# Patient Record
Sex: FEMALE | Race: WHITE | NOT HISPANIC OR LATINO | Employment: OTHER | URBAN - METROPOLITAN AREA
[De-identification: names, ages, dates, MRNs, and addresses within clinical notes are randomized per-mention and may not be internally consistent; named-entity substitution may affect disease eponyms.]

---

## 2017-05-04 ENCOUNTER — APPOINTMENT (OUTPATIENT)
Dept: WOMENS IMAGING | Facility: HOSPITAL | Age: 62
End: 2017-05-04

## 2017-05-04 PROCEDURE — 77067 SCR MAMMO BI INCL CAD: CPT | Performed by: RADIOLOGY

## 2019-09-18 ENCOUNTER — APPOINTMENT (OUTPATIENT)
Dept: WOMENS IMAGING | Facility: HOSPITAL | Age: 64
End: 2019-09-18

## 2019-09-18 PROCEDURE — 77067 SCR MAMMO BI INCL CAD: CPT | Performed by: RADIOLOGY

## 2021-05-19 ENCOUNTER — APPOINTMENT (OUTPATIENT)
Dept: WOMENS IMAGING | Facility: HOSPITAL | Age: 66
End: 2021-05-19

## 2021-05-19 PROCEDURE — 77067 SCR MAMMO BI INCL CAD: CPT | Performed by: RADIOLOGY

## 2022-03-12 DIAGNOSIS — E11.641 TYPE 2 DIABETES MELLITUS WITH HYPOGLYCEMIA AND COMA, WITH LONG-TERM CURRENT USE OF INSULIN: ICD-10-CM

## 2022-03-12 DIAGNOSIS — Z79.4 TYPE 2 DIABETES MELLITUS WITH HYPOGLYCEMIA AND COMA, WITH LONG-TERM CURRENT USE OF INSULIN: ICD-10-CM

## 2022-03-12 DIAGNOSIS — I10 PRIMARY HYPERTENSION: ICD-10-CM

## 2022-03-12 DIAGNOSIS — E03.9 HYPOTHYROIDISM, UNSPECIFIED TYPE: Primary | ICD-10-CM

## 2022-05-06 ENCOUNTER — LAB (OUTPATIENT)
Dept: FAMILY MEDICINE CLINIC | Facility: CLINIC | Age: 67
End: 2022-05-06

## 2022-05-06 DIAGNOSIS — Z79.4 TYPE 2 DIABETES MELLITUS WITH HYPOGLYCEMIA AND COMA, WITH LONG-TERM CURRENT USE OF INSULIN: ICD-10-CM

## 2022-05-06 DIAGNOSIS — E03.9 HYPOTHYROIDISM, UNSPECIFIED TYPE: ICD-10-CM

## 2022-05-06 DIAGNOSIS — E11.00 TYPE II DIABETES MELLITUS WITH HYPEROSMOLARITY, UNCONTROLLED: Primary | ICD-10-CM

## 2022-05-06 DIAGNOSIS — E11.65 TYPE II DIABETES MELLITUS WITH HYPEROSMOLARITY, UNCONTROLLED: Primary | ICD-10-CM

## 2022-05-06 DIAGNOSIS — E11.641 TYPE 2 DIABETES MELLITUS WITH HYPOGLYCEMIA AND COMA, WITH LONG-TERM CURRENT USE OF INSULIN: ICD-10-CM

## 2022-05-06 DIAGNOSIS — I10 PRIMARY HYPERTENSION: ICD-10-CM

## 2022-05-06 PROCEDURE — 36415 COLL VENOUS BLD VENIPUNCTURE: CPT | Performed by: FAMILY MEDICINE

## 2022-05-07 LAB
ALBUMIN SERPL-MCNC: 4.3 G/DL (ref 3.8–4.8)
ALBUMIN/GLOB SERPL: 1.7 {RATIO} (ref 1.2–2.2)
ALP SERPL-CCNC: 85 IU/L (ref 44–121)
ALT SERPL-CCNC: 31 IU/L (ref 0–32)
AST SERPL-CCNC: 24 IU/L (ref 0–40)
BILIRUB SERPL-MCNC: 0.3 MG/DL (ref 0–1.2)
BUN SERPL-MCNC: 21 MG/DL (ref 8–27)
BUN/CREAT SERPL: 20 (ref 12–28)
CALCIUM SERPL-MCNC: 10 MG/DL (ref 8.7–10.3)
CHLORIDE SERPL-SCNC: 104 MMOL/L (ref 96–106)
CO2 SERPL-SCNC: 19 MMOL/L (ref 20–29)
CREAT SERPL-MCNC: 1.04 MG/DL (ref 0.57–1)
EGFRCR SERPLBLD CKD-EPI 2021: 59 ML/MIN/1.73
GLOBULIN SER CALC-MCNC: 2.6 G/DL (ref 1.5–4.5)
GLUCOSE SERPL-MCNC: 108 MG/DL (ref 65–99)
HBA1C MFR BLD: 8.3 % (ref 4.8–5.6)
POTASSIUM SERPL-SCNC: 4.8 MMOL/L (ref 3.5–5.2)
PROT SERPL-MCNC: 6.9 G/DL (ref 6–8.5)
SODIUM SERPL-SCNC: 143 MMOL/L (ref 134–144)

## 2022-05-16 RX ORDER — PIOGLITAZONEHYDROCHLORIDE 15 MG/1
15 TABLET ORAL DAILY
COMMUNITY
End: 2022-05-16

## 2022-05-16 RX ORDER — PIOGLITAZONEHYDROCHLORIDE 15 MG/1
TABLET ORAL
Qty: 90 TABLET | Refills: 0 | Status: SHIPPED | OUTPATIENT
Start: 2022-05-16 | End: 2022-08-25

## 2022-05-16 RX ORDER — SITAGLIPTIN 100 MG/1
TABLET, FILM COATED ORAL
Qty: 90 TABLET | Refills: 0 | Status: SHIPPED | OUTPATIENT
Start: 2022-05-16 | End: 2022-08-15

## 2022-05-16 NOTE — TELEPHONE ENCOUNTER
Rx Refill Note    Requested Prescriptions     Pending Prescriptions Disp Refills   • Januvia 100 MG tablet [Pharmacy Med Name: JANUVIA 100 MG TABLET] 90 tablet      Sig: TAKE ONE TABLET BY MOUTH DAILY   • pioglitazone (ACTOS) 15 MG tablet [Pharmacy Med Name: PIOGLITAZONE HCL 15 MG TABLET] 90 tablet      Sig: TAKE ONE TABLET BY MOUTH DAILY        Last office visit with prescribing clinician: 02/10/2022      Next office visit with prescribing clinician: 5/24/2022   Last labs: 01/27/2022  Last refill: 02/10/2022

## 2022-05-24 ENCOUNTER — OFFICE VISIT (OUTPATIENT)
Dept: FAMILY MEDICINE CLINIC | Facility: CLINIC | Age: 67
End: 2022-05-24

## 2022-05-24 VITALS
HEART RATE: 63 BPM | TEMPERATURE: 97.6 F | SYSTOLIC BLOOD PRESSURE: 128 MMHG | RESPIRATION RATE: 14 BRPM | WEIGHT: 231.2 LBS | DIASTOLIC BLOOD PRESSURE: 62 MMHG | HEIGHT: 69 IN | OXYGEN SATURATION: 96 % | BODY MASS INDEX: 34.24 KG/M2

## 2022-05-24 DIAGNOSIS — E78.2 MIXED HYPERLIPIDEMIA: ICD-10-CM

## 2022-05-24 DIAGNOSIS — Z79.899 HIGH RISK MEDICATION USE: ICD-10-CM

## 2022-05-24 DIAGNOSIS — J01.90 ACUTE NON-RECURRENT SINUSITIS, UNSPECIFIED LOCATION: ICD-10-CM

## 2022-05-24 DIAGNOSIS — Z79.4 TYPE 2 DIABETES MELLITUS WITH HYPERGLYCEMIA, WITH LONG-TERM CURRENT USE OF INSULIN: Primary | ICD-10-CM

## 2022-05-24 DIAGNOSIS — I10 BENIGN HYPERTENSION: ICD-10-CM

## 2022-05-24 DIAGNOSIS — E11.65 TYPE 2 DIABETES MELLITUS WITH HYPERGLYCEMIA, WITH LONG-TERM CURRENT USE OF INSULIN: Primary | ICD-10-CM

## 2022-05-24 DIAGNOSIS — I25.810 CORONARY ARTERY DISEASE INVOLVING CORONARY BYPASS GRAFT OF NATIVE HEART WITHOUT ANGINA PECTORIS: ICD-10-CM

## 2022-05-24 DIAGNOSIS — Z11.59 ENCOUNTER FOR HEPATITIS C SCREENING TEST FOR LOW RISK PATIENT: ICD-10-CM

## 2022-05-24 PROBLEM — G47.30 SLEEP APNEA: Status: ACTIVE | Noted: 2022-05-24

## 2022-05-24 PROBLEM — E11.9 TYPE 2 DIABETES MELLITUS: Status: ACTIVE | Noted: 2022-05-24

## 2022-05-24 PROBLEM — E66.811 OBESITY (BMI 30.0-34.9): Status: ACTIVE | Noted: 2022-05-24

## 2022-05-24 PROBLEM — E66.9 OBESITY (BMI 30.0-34.9): Status: ACTIVE | Noted: 2022-05-24

## 2022-05-24 PROBLEM — H91.90 HEARING LOSS: Status: ACTIVE | Noted: 2017-04-06

## 2022-05-24 PROBLEM — M17.10 ARTHRITIS OF KNEE: Status: ACTIVE | Noted: 2022-05-24

## 2022-05-24 PROCEDURE — 99214 OFFICE O/P EST MOD 30 MIN: CPT | Performed by: FAMILY MEDICINE

## 2022-05-24 RX ORDER — AMOXICILLIN 875 MG/1
875 TABLET, COATED ORAL 2 TIMES DAILY
Qty: 20 TABLET | Refills: 0 | Status: SHIPPED | OUTPATIENT
Start: 2022-05-24 | End: 2022-06-03

## 2022-05-24 RX ORDER — ATORVASTATIN CALCIUM 40 MG/1
40 TABLET, FILM COATED ORAL DAILY
Qty: 90 TABLET | Refills: 1 | Status: SHIPPED | OUTPATIENT
Start: 2022-05-24 | End: 2022-10-24 | Stop reason: SDUPTHER

## 2022-05-24 RX ORDER — PEN NEEDLE, DIABETIC 31 G X1/4"
NEEDLE, DISPOSABLE MISCELLANEOUS
COMMUNITY
Start: 2022-03-27 | End: 2022-09-09

## 2022-05-24 RX ORDER — VALSARTAN 160 MG/1
160 TABLET ORAL DAILY
Qty: 90 TABLET | Refills: 1 | Status: SHIPPED | OUTPATIENT
Start: 2022-05-24 | End: 2022-10-24 | Stop reason: SDUPTHER

## 2022-05-24 RX ORDER — INSULIN GLARGINE 100 [IU]/ML
80-100 INJECTION, SOLUTION SUBCUTANEOUS NIGHTLY
Qty: 30 PEN | Refills: 1 | Status: SHIPPED | OUTPATIENT
Start: 2022-05-24 | End: 2023-02-10

## 2022-05-24 RX ORDER — FLUCONAZOLE 150 MG/1
TABLET ORAL
Qty: 2 TABLET | Refills: 1 | Status: SHIPPED | OUTPATIENT
Start: 2022-05-24 | End: 2022-08-24

## 2022-05-24 RX ORDER — BISOPROLOL FUMARATE 5 MG/1
5 TABLET, FILM COATED ORAL DAILY
Qty: 90 TABLET | Refills: 1 | Status: SHIPPED | OUTPATIENT
Start: 2022-05-24 | End: 2022-10-24 | Stop reason: SDUPTHER

## 2022-05-24 NOTE — PROGRESS NOTES
Follow Up Office Visit      Patient Name: Elisabet Harris  : 1955   MRN: [id number]     Chief Complaint:    Chief Complaint   Patient presents with   • Hypertension     PT IS TO FOLLOW UP ON BLOODWORK    • Hypothyroidism   • Diabetes       History of Present Illness: Elisabet Harris is a 66 y.o. female who is here today to   follow-up on her diabetes heart disease hypertension hyperlipidemia.  She is doing well but a little under the weather today has had a cough cold congestion and left earache.  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  She says she has gained weight and is on 86 units of Basaglar now.  No chest pains palpitations short of breath no fever chills.    Subjective      Review of Systems:   Review of Systems    Past Medical History:   Past Medical History:   Diagnosis Date   • Atrial fibrillation (HCC)     post op   • Coronary artery disease    • Diabetes mellitus (HCC)    • Dyslipidemia    • Hypertension    • Hypothyroidism        Past Surgical History:   Past Surgical History:   Procedure Laterality Date   • CORONARY ARTERY BYPASS GRAFT  2016   • CORONARY STENT PLACEMENT         • HYSTERECTOMY         Family History:   Family History   Problem Relation Age of Onset   • Mitral valve prolapse Mother    • Heart failure Mother    • Heart attack Father    • Prostate cancer Father    • Glaucoma Sister    • Hyperlipidemia Brother        Social History:   Social History     Socioeconomic History   • Marital status:    • Number of children: 1   Tobacco Use   • Smoking status: Never Smoker   • Smokeless tobacco: Never Used   Vaping Use   • Vaping Use: Never used   Substance and Sexual Activity   • Alcohol use: No   • Drug use: No   • Sexual activity: Defer       Medications:     Current Outpatient Medications:   •  amLODIPine (NORVASC) 5 MG tablet, Take  by mouth 2 (two) times a day., Disp: , Rfl:   •  aspirin 325 MG  "tablet, Take 1 tablet by mouth., Disp: , Rfl:   •  bisoprolol (ZEBeta) 5 MG tablet, Take 1 tablet by mouth Daily., Disp: 90 tablet, Rfl: 1  •  glimepiride (AMARYL) 4 MG tablet, Take  by mouth 2 (two) times a day., Disp: , Rfl:   •  Januvia 100 MG tablet, TAKE ONE TABLET BY MOUTH DAILY, Disp: 90 tablet, Rfl: 0  •  [employer] Pen Needles 31G X 6 MM misc, USE AS DIRECTED, Disp: , Rfl:   •  levothyroxine (SYNTHROID, LEVOTHROID) 25 MCG tablet, Take  by mouth daily., Disp: , Rfl:   •  metFORMIN (GLUCOPHAGE) 850 MG tablet, Take  by mouth., Disp: , Rfl:   •  pioglitazone (ACTOS) 15 MG tablet, TAKE ONE TABLET BY MOUTH DAILY, Disp: 90 tablet, Rfl: 0  •  valsartan (DIOVAN) 160 MG tablet, Take 1 tablet by mouth Daily., Disp: 90 tablet, Rfl: 1  •  amoxicillin (AMOXIL) 875 MG tablet, Take 1 tablet by mouth 2 (Two) Times a Day for 10 days., Disp: 20 tablet, Rfl: 0  •  atorvastatin (LIPITOR) 40 MG tablet, Take 1 tablet by mouth Daily., Disp: 90 tablet, Rfl: 1  •  fluconazole (Diflucan) 150 MG tablet, Take 1 tab PO on day 1 then 1 tab PO on day 3, Disp: 2 tablet, Rfl: 1  •  Insulin Glargine (BASAGLAR KWIKPEN) 100 UNIT/ML injection pen, Inject  Units under the skin into the appropriate area as directed Every Night., Disp: 30 pen, Rfl: 1    Allergies:   Allergies   Allergen Reactions   • Invokana [Canagliflozin] Unknown - Low Severity   • Victoza [Liraglutide] Itching and Rash       Objective     Physical Exam:  Vital Signs:   Vitals:    05/24/22 0905   BP: 128/62   BP Location: Left arm   Patient Position: Sitting   Cuff Size: Large Adult   Pulse: 63   Resp: 14   Temp: 97.6 °F (36.4 °C)   SpO2: 96%   Weight: 105 kg (231 lb 3.2 oz)   Height: 175.3 cm (69\")   PainSc: 0-No pain     Body mass index is 34.14 kg/m².     Physical Exam  Vitals and nursing note reviewed.   Constitutional:       Appearance: Normal appearance. She is obese.      Comments: Alert oriented pleasant white female with a slight occasional cough congestion.   HENT: "      Head: Normocephalic and atraumatic.      Right Ear: Tympanic membrane and ear canal normal.      Left Ear: Ear canal normal.      Ears:      Comments: Left TM a little red little fluid there behind the tympanic membrane.  He has a hearing aid on the left side.    Little bit of wax in the right external canal.     Mouth/Throat:      Mouth: Mucous membranes are moist.      Pharynx: Oropharynx is clear. No posterior oropharyngeal erythema.   Cardiovascular:      Rate and Rhythm: Normal rate and regular rhythm.   Pulmonary:      Effort: Pulmonary effort is normal.      Breath sounds: Normal breath sounds.   Musculoskeletal:      Cervical back: Normal range of motion and neck supple. No rigidity or tenderness.      Right lower leg: No edema.      Left lower leg: No edema.   Lymphadenopathy:      Cervical: No cervical adenopathy.   Skin:     General: Skin is warm and dry.   Neurological:      Mental Status: She is alert.   Psychiatric:         Mood and Affect: Mood normal.         Behavior: Behavior normal.         Procedures    Assessment / Plan      Assessment/Plan:   Diagnoses and all orders for this visit:    1. Type 2 diabetes mellitus with hyperglycemia, with long-term current use of insulin (HCC) (Primary)  -     Insulin Glargine (BASAGLAR KWIKPEN) 100 UNIT/ML injection pen; Inject  Units under the skin into the appropriate area as directed Every Night.  Dispense: 30 pen; Refill: 1  -     Comprehensive Metabolic Panel; Future  -     Hemoglobin A1c; Future    2. High risk medication use  -     CBC Auto Differential; Future  -     CK; Future    3. Coronary artery disease involving coronary bypass graft of native heart without angina pectoris  -     atorvastatin (LIPITOR) 40 MG tablet; Take 1 tablet by mouth Daily.  Dispense: 90 tablet; Refill: 1  -     bisoprolol (ZEBeta) 5 MG tablet; Take 1 tablet by mouth Daily.  Dispense: 90 tablet; Refill: 1    4. Benign hypertension  -     valsartan (DIOVAN) 160 MG  tablet; Take 1 tablet by mouth Daily.  Dispense: 90 tablet; Refill: 1    5. Mixed hyperlipidemia  -     Lipid Panel; Future    6. Encounter for hepatitis C screening test for low risk patient  -     Hepatitis C Antibody; Future    7. Acute non-recurrent sinusitis, unspecified location  -     amoxicillin (AMOXIL) 875 MG tablet; Take 1 tablet by mouth 2 (Two) Times a Day for 10 days.  Dispense: 20 tablet; Refill: 0    Other orders  -     fluconazole (Diflucan) 150 MG tablet; Take 1 tab PO on day 1 then 1 tab PO on day 3  Dispense: 2 tablet; Refill: 1         For her diabetes she will work on cutting carbs and getting more exercise will keep her meds the same for now we will not increase her pioglitazone at this point.  Follow-up in 3 months.  And will get annual wellness then as well.    Refilled her other medicines as well she should have the Actos as we sent it in on May 16.    For her sinus infection and ear infection we will give her amoxicillin Diflucan after that for yeast infection if needed and instructed on doses proper way to take medicines risk benefits side effects etc.    She cannot tolerate 80 mg Lipitor too many muscle aches.  Labs reviewed with her she knows to try to get her A1c below 8.        Follow Up:   Return in about 3 months (around 8/10/2022) for Recheck, Labs prior next visit, Schedule SINCERE ALEJO at same visit with me.        Johny Brantley MD  [facility]

## 2022-08-15 RX ORDER — SITAGLIPTIN 100 MG/1
TABLET, FILM COATED ORAL
Qty: 90 TABLET | Refills: 0 | Status: SHIPPED | OUTPATIENT
Start: 2022-08-15 | End: 2022-08-25 | Stop reason: SDUPTHER

## 2022-08-15 NOTE — TELEPHONE ENCOUNTER
Rx Refill Note    Requested Prescriptions     Pending Prescriptions Disp Refills   • Januvia 100 MG tablet [Pharmacy Med Name: JANUVIA 100 MG TABLET] 90 tablet 0     Sig: TAKE ONE TABLET BY MOUTH DAILY        Last office visit with prescribing clinician: 5/24/2022      Next office visit with prescribing clinician: Visit date not found   Last labs:   Last refill: 05/16/2022   Pharmacy (be sure to add in Epic). correct

## 2022-08-18 ENCOUNTER — LAB (OUTPATIENT)
Dept: FAMILY MEDICINE CLINIC | Facility: CLINIC | Age: 67
End: 2022-08-18

## 2022-08-18 DIAGNOSIS — E11.65 TYPE 2 DIABETES MELLITUS WITH HYPERGLYCEMIA, WITH LONG-TERM CURRENT USE OF INSULIN: ICD-10-CM

## 2022-08-18 DIAGNOSIS — Z79.899 HIGH RISK MEDICATION USE: ICD-10-CM

## 2022-08-18 DIAGNOSIS — Z79.4 TYPE 2 DIABETES MELLITUS WITH HYPERGLYCEMIA, WITH LONG-TERM CURRENT USE OF INSULIN: ICD-10-CM

## 2022-08-18 DIAGNOSIS — Z11.59 ENCOUNTER FOR HEPATITIS C SCREENING TEST FOR LOW RISK PATIENT: ICD-10-CM

## 2022-08-18 DIAGNOSIS — E78.2 MIXED HYPERLIPIDEMIA: ICD-10-CM

## 2022-08-18 PROCEDURE — 36415 COLL VENOUS BLD VENIPUNCTURE: CPT | Performed by: FAMILY MEDICINE

## 2022-08-19 LAB
ALBUMIN SERPL-MCNC: 4.6 G/DL (ref 3.8–4.8)
ALBUMIN/GLOB SERPL: 2 {RATIO} (ref 1.2–2.2)
ALP SERPL-CCNC: 74 IU/L (ref 44–121)
ALT SERPL-CCNC: 43 IU/L (ref 0–32)
AST SERPL-CCNC: 35 IU/L (ref 0–40)
BASOPHILS # BLD AUTO: 0.1 X10E3/UL (ref 0–0.2)
BASOPHILS NFR BLD AUTO: 1 %
BILIRUB SERPL-MCNC: 0.4 MG/DL (ref 0–1.2)
BUN SERPL-MCNC: 18 MG/DL (ref 8–27)
BUN/CREAT SERPL: 17 (ref 12–28)
CALCIUM SERPL-MCNC: 10.1 MG/DL (ref 8.7–10.3)
CHLORIDE SERPL-SCNC: 103 MMOL/L (ref 96–106)
CHOLEST SERPL-MCNC: 165 MG/DL (ref 100–199)
CK SERPL-CCNC: 59 U/L (ref 32–182)
CO2 SERPL-SCNC: 20 MMOL/L (ref 20–29)
CREAT SERPL-MCNC: 1.07 MG/DL (ref 0.57–1)
EGFRCR-CYS SERPLBLD CKD-EPI 2021: 57 ML/MIN/1.73
EOSINOPHIL # BLD AUTO: 0.1 X10E3/UL (ref 0–0.4)
EOSINOPHIL NFR BLD AUTO: 1 %
ERYTHROCYTE [DISTWIDTH] IN BLOOD BY AUTOMATED COUNT: 14.5 % (ref 11.7–15.4)
GLOBULIN SER CALC-MCNC: 2.3 G/DL (ref 1.5–4.5)
GLUCOSE SERPL-MCNC: 82 MG/DL (ref 65–99)
HBA1C MFR BLD: 8 % (ref 4.8–5.6)
HCT VFR BLD AUTO: 40.6 % (ref 34–46.6)
HCV AB S/CO SERPL IA: <0.1 S/CO RATIO (ref 0–0.9)
HDLC SERPL-MCNC: 38 MG/DL
HGB BLD-MCNC: 12.5 G/DL (ref 11.1–15.9)
IMM GRANULOCYTES # BLD AUTO: 0 X10E3/UL (ref 0–0.1)
IMM GRANULOCYTES NFR BLD AUTO: 0 %
LDLC SERPL CALC-MCNC: 98 MG/DL (ref 0–99)
LYMPHOCYTES # BLD AUTO: 2.3 X10E3/UL (ref 0.7–3.1)
LYMPHOCYTES NFR BLD AUTO: 25 %
MCH RBC QN AUTO: 27.5 PG (ref 26.6–33)
MCHC RBC AUTO-ENTMCNC: 30.8 G/DL (ref 31.5–35.7)
MCV RBC AUTO: 89 FL (ref 79–97)
MONOCYTES # BLD AUTO: 0.7 X10E3/UL (ref 0.1–0.9)
MONOCYTES NFR BLD AUTO: 8 %
NEUTROPHILS # BLD AUTO: 6.1 X10E3/UL (ref 1.4–7)
NEUTROPHILS NFR BLD AUTO: 65 %
PLATELET # BLD AUTO: 439 X10E3/UL (ref 150–450)
POTASSIUM SERPL-SCNC: 4.5 MMOL/L (ref 3.5–5.2)
PROT SERPL-MCNC: 6.9 G/DL (ref 6–8.5)
RBC # BLD AUTO: 4.55 X10E6/UL (ref 3.77–5.28)
SODIUM SERPL-SCNC: 142 MMOL/L (ref 134–144)
TRIGL SERPL-MCNC: 166 MG/DL (ref 0–149)
VLDLC SERPL CALC-MCNC: 29 MG/DL (ref 5–40)
WBC # BLD AUTO: 9.2 X10E3/UL (ref 3.4–10.8)

## 2022-08-24 RX ORDER — ATORVASTATIN CALCIUM 80 MG/1
80 TABLET, FILM COATED ORAL DAILY
Qty: 90 TABLET | Refills: 1 | Status: CANCELLED | OUTPATIENT
Start: 2022-08-24

## 2022-08-25 ENCOUNTER — OFFICE VISIT (OUTPATIENT)
Dept: FAMILY MEDICINE CLINIC | Facility: CLINIC | Age: 67
End: 2022-08-25

## 2022-08-25 VITALS
HEIGHT: 69 IN | SYSTOLIC BLOOD PRESSURE: 144 MMHG | DIASTOLIC BLOOD PRESSURE: 62 MMHG | BODY MASS INDEX: 34.07 KG/M2 | OXYGEN SATURATION: 95 % | HEART RATE: 75 BPM | WEIGHT: 230 LBS

## 2022-08-25 DIAGNOSIS — I25.810 CORONARY ARTERY DISEASE INVOLVING CORONARY BYPASS GRAFT OF NATIVE HEART WITHOUT ANGINA PECTORIS: ICD-10-CM

## 2022-08-25 DIAGNOSIS — E03.9 HYPOTHYROIDISM, UNSPECIFIED TYPE: ICD-10-CM

## 2022-08-25 DIAGNOSIS — Z79.899 HIGH RISK MEDICATION USE: ICD-10-CM

## 2022-08-25 DIAGNOSIS — Z79.4 TYPE 2 DIABETES MELLITUS WITH HYPERGLYCEMIA, WITH LONG-TERM CURRENT USE OF INSULIN: Primary | ICD-10-CM

## 2022-08-25 DIAGNOSIS — I10 PRIMARY HYPERTENSION: ICD-10-CM

## 2022-08-25 DIAGNOSIS — E78.2 MIXED HYPERLIPIDEMIA: ICD-10-CM

## 2022-08-25 DIAGNOSIS — E11.65 TYPE 2 DIABETES MELLITUS WITH HYPERGLYCEMIA, WITH LONG-TERM CURRENT USE OF INSULIN: Primary | ICD-10-CM

## 2022-08-25 PROCEDURE — 99214 OFFICE O/P EST MOD 30 MIN: CPT | Performed by: FAMILY MEDICINE

## 2022-08-25 RX ORDER — PIOGLITAZONEHYDROCHLORIDE 30 MG/1
30 TABLET ORAL DAILY
Qty: 90 TABLET | Refills: 0 | Status: SHIPPED | OUTPATIENT
Start: 2022-08-25 | End: 2022-11-15

## 2022-08-25 RX ORDER — AMLODIPINE BESYLATE 10 MG/1
10 TABLET ORAL DAILY
Qty: 90 TABLET | Refills: 1 | Status: SHIPPED | OUTPATIENT
Start: 2022-08-25 | End: 2023-02-06

## 2022-08-25 RX ORDER — EZETIMIBE 10 MG/1
10 TABLET ORAL DAILY
Qty: 90 TABLET | Refills: 1 | Status: SHIPPED | OUTPATIENT
Start: 2022-08-25 | End: 2022-10-24

## 2022-08-25 RX ORDER — LEVOTHYROXINE SODIUM 0.07 MG/1
TABLET ORAL
COMMUNITY
Start: 2022-07-08 | End: 2022-10-17

## 2022-08-25 NOTE — PROGRESS NOTES
Follow Up Office Visit      Patient Name: Elisabet Harris  : 1955   MRN: [id number]     Chief Complaint:    Chief Complaint   Patient presents with   • Med Refill       History of Present Illness: Elisabet Harris is a 67 y.o. female who is here today to   upon diabetes hypertension hyperlipidemia and to go over blood work.  He is doing well.  Has no complaints she is on 80 units of her Basaglar now hand sugars the past 2 days been 81 and 109.  Fasting in the morning.  He is swimming in the pool some she has no history of heart failure    Review of Systems   Constitutional: Negative for fatigue and fever.   Respiratory: Negative for cough and shortness of breath.    Cardiovascular: Negative for chest pain and palpitations.   Skin: Negative for rash or itching      Subjective      Review of Systems:   Review of Systems    Past Medical History:   Past Medical History:   Diagnosis Date   • Atrial fibrillation (HCC)     post op   • Coronary artery disease    • Diabetes mellitus (HCC)    • Dyslipidemia    • Hypertension    • Hypothyroidism        Past Surgical History:   Past Surgical History:   Procedure Laterality Date   • CORONARY ARTERY BYPASS GRAFT  2016   • CORONARY STENT PLACEMENT         • HYSTERECTOMY         Family History:   Family History   Problem Relation Age of Onset   • Mitral valve prolapse Mother    • Heart failure Mother    • Heart attack Father    • Prostate cancer Father    • Glaucoma Sister    • Hyperlipidemia Brother        Social History:   Social History     Socioeconomic History   • Marital status:    • Number of children: 1   Tobacco Use   • Smoking status: Never Smoker   • Smokeless tobacco: Never Used   Vaping Use   • Vaping Use: Never used   Substance and Sexual Activity   • Alcohol use: No   • Drug use: No   • Sexual activity: Defer       Medications:     Current Outpatient Medications:   •  amLODIPine (NORVASC) 5 MG tablet, Take  by mouth 2 (two) times a day.,  "Disp: , Rfl:   •  aspirin 325 MG tablet, Take 1 tablet by mouth., Disp: , Rfl:   •  atorvastatin (LIPITOR) 40 MG tablet, Take 1 tablet by mouth Daily., Disp: 90 tablet, Rfl: 1  •  bisoprolol (ZEBeta) 5 MG tablet, Take 1 tablet by mouth Daily., Disp: 90 tablet, Rfl: 1  •  glimepiride (AMARYL) 4 MG tablet, Take  by mouth 2 (two) times a day., Disp: , Rfl:   •  Insulin Glargine (BASAGLAR KWIKPEN) 100 UNIT/ML injection pen, Inject  Units under the skin into the appropriate area as directed Every Night., Disp: 30 pen, Rfl: 1  •  [employer] Pen Needles 31G X 6 MM misc, USE AS DIRECTED, Disp: , Rfl:   •  levothyroxine (SYNTHROID, LEVOTHROID) 75 MCG tablet, , Disp: , Rfl:   •  metFORMIN (GLUCOPHAGE) 850 MG tablet, 1 po  Tid  w  meals, Disp: 270 tablet, Rfl: 1  •  SITagliptin (Januvia) 100 MG tablet, Take 1 tablet by mouth Daily., Disp: 90 tablet, Rfl: 1  •  valsartan (DIOVAN) 160 MG tablet, Take 1 tablet by mouth Daily., Disp: 90 tablet, Rfl: 1  •  amLODIPine (NORVASC) 10 MG tablet, Take 1 tablet by mouth Daily., Disp: 90 tablet, Rfl: 1  •  ezetimibe (ZETIA) 10 MG tablet, Take 1 tablet by mouth Daily., Disp: 90 tablet, Rfl: 1  •  levothyroxine (SYNTHROID, LEVOTHROID) 25 MCG tablet, Take  by mouth daily., Disp: , Rfl:   •  pioglitazone (Actos) 30 MG tablet, Take 1 tablet by mouth Daily., Disp: 90 tablet, Rfl: 0    Allergies:   Allergies   Allergen Reactions   • Invokana [Canagliflozin] Unknown - Low Severity   • Victoza [Liraglutide] Itching and Rash       Objective     Physical Exam:  Vital Signs:   Vitals:    08/25/22 0804   BP: 144/62   BP Location: Left arm   Patient Position: Sitting   Cuff Size: Large Adult   Pulse: 75   SpO2: 95%   Weight: 104 kg (230 lb)   Height: 175.3 cm (69\")   PainSc: 0-No pain     Body mass index is 33.97 kg/m².     Physical Exam  Vitals and nursing note reviewed.   Constitutional:       Appearance: Normal appearance. She is obese.   HENT:      Head: Normocephalic and atraumatic. "   Cardiovascular:      Rate and Rhythm: Normal rate and regular rhythm.   Pulmonary:      Effort: Pulmonary effort is normal.      Breath sounds: Normal breath sounds.   Musculoskeletal:         General: Normal range of motion.      Cervical back: Normal range of motion and neck supple.      Right lower leg: No edema.      Left lower leg: No edema.   Skin:     General: Skin is warm and dry.   Neurological:      General: No focal deficit present.      Mental Status: She is alert.         Procedures    PHQ-9 Total Score:       Assessment / Plan      Assessment/Plan:   Diagnoses and all orders for this visit:    1. Type 2 diabetes mellitus with hyperglycemia, with long-term current use of insulin (HCC) (Primary)  -     Comprehensive Metabolic Panel; Future  -     Hemoglobin A1c; Future  -     pioglitazone (Actos) 30 MG tablet; Take 1 tablet by mouth Daily.  Dispense: 90 tablet; Refill: 0  -     metFORMIN (GLUCOPHAGE) 850 MG tablet; 1 po  Tid  w  meals  Dispense: 270 tablet; Refill: 1  -     SITagliptin (Januvia) 100 MG tablet; Take 1 tablet by mouth Daily.  Dispense: 90 tablet; Refill: 1    2. Hypothyroidism, unspecified type  -     TSH; Future    3. Mixed hyperlipidemia  -     Lipid Panel; Future  -     CK; Future  -     ezetimibe (ZETIA) 10 MG tablet; Take 1 tablet by mouth Daily.  Dispense: 90 tablet; Refill: 1    4. High risk medication use  -     Comprehensive Metabolic Panel; Future  -     CK; Future    5. Coronary artery disease involving coronary bypass graft of native heart without angina pectoris    6. Primary hypertension  -     amLODIPine (NORVASC) 10 MG tablet; Take 1 tablet by mouth Daily.  Dispense: 90 tablet; Refill: 1         Labs reviewed with her in detail   In regards to her diabetes A1c is still too high recommend increasing Actos to 30 mg/day--watch for weight gain or fluid buildup.  Continue to cut back on carbs walk 30-60 minutes/day 5 days a week or do her swimming for 30 minutes 5 days a  week.    LDL still elevated she has had a history of CAD would like to get her LDL lower we will add Zetia 10 mg once per day follow-up in 3 months for blood work.    Do annual wellness next time    And microalbumin today and reminded her to do yearly diabetic eye exam.  BMI is >= 30 and <35. (Class 1 Obesity). The following options were offered after discussion;: exercise counseling/recommendations and nutrition counseling/recommendations      Follow Up:   Return in about 2 months (around 11/9/2022) for Recheck, Labs prior next visit, Schedule SINCERE ALEJO at same visit with me.        Johny Brantley MD  [facility] [place] [facility]   Portions of note created with Dragon voice recognition technology

## 2022-09-09 RX ORDER — PEN NEEDLE, DIABETIC 31 G X1/4"
NEEDLE, DISPOSABLE MISCELLANEOUS
Qty: 100 EACH | Refills: 3 | Status: SHIPPED | OUTPATIENT
Start: 2022-09-09

## 2022-10-17 RX ORDER — LEVOTHYROXINE SODIUM 0.07 MG/1
TABLET ORAL
Qty: 30 TABLET | Refills: 0 | Status: SHIPPED | OUTPATIENT
Start: 2022-10-17 | End: 2022-10-24 | Stop reason: SDUPTHER

## 2022-10-18 ENCOUNTER — LAB (OUTPATIENT)
Dept: FAMILY MEDICINE CLINIC | Facility: CLINIC | Age: 67
End: 2022-10-18

## 2022-10-18 DIAGNOSIS — Z79.899 HIGH RISK MEDICATION USE: ICD-10-CM

## 2022-10-18 DIAGNOSIS — E78.2 MIXED HYPERLIPIDEMIA: ICD-10-CM

## 2022-10-18 PROCEDURE — 36415 COLL VENOUS BLD VENIPUNCTURE: CPT | Performed by: FAMILY MEDICINE

## 2022-10-19 LAB
CHOLEST SERPL-MCNC: 119 MG/DL (ref 100–199)
CK SERPL-CCNC: 91 U/L (ref 32–182)
HDLC SERPL-MCNC: 37 MG/DL
LDLC SERPL CALC-MCNC: 62 MG/DL (ref 0–99)
TRIGL SERPL-MCNC: 109 MG/DL (ref 0–149)
VLDLC SERPL CALC-MCNC: 20 MG/DL (ref 5–40)

## 2022-10-24 ENCOUNTER — OFFICE VISIT (OUTPATIENT)
Dept: FAMILY MEDICINE CLINIC | Facility: CLINIC | Age: 67
End: 2022-10-24

## 2022-10-24 VITALS
SYSTOLIC BLOOD PRESSURE: 134 MMHG | OXYGEN SATURATION: 98 % | DIASTOLIC BLOOD PRESSURE: 70 MMHG | HEIGHT: 69 IN | WEIGHT: 233 LBS | RESPIRATION RATE: 15 BRPM | TEMPERATURE: 97.1 F | HEART RATE: 67 BPM | BODY MASS INDEX: 34.51 KG/M2

## 2022-10-24 DIAGNOSIS — I10 BENIGN HYPERTENSION: ICD-10-CM

## 2022-10-24 DIAGNOSIS — E03.9 HYPOTHYROIDISM, UNSPECIFIED TYPE: ICD-10-CM

## 2022-10-24 DIAGNOSIS — Z00.00 ENCOUNTER FOR SUBSEQUENT ANNUAL WELLNESS VISIT (AWV) IN MEDICARE PATIENT: Primary | ICD-10-CM

## 2022-10-24 DIAGNOSIS — Z79.4 TYPE 2 DIABETES MELLITUS WITH HYPERGLYCEMIA, WITH LONG-TERM CURRENT USE OF INSULIN: ICD-10-CM

## 2022-10-24 DIAGNOSIS — E78.2 MIXED HYPERLIPIDEMIA: ICD-10-CM

## 2022-10-24 DIAGNOSIS — E11.65 TYPE 2 DIABETES MELLITUS WITH HYPERGLYCEMIA, WITH LONG-TERM CURRENT USE OF INSULIN: ICD-10-CM

## 2022-10-24 DIAGNOSIS — I25.810 CORONARY ARTERY DISEASE INVOLVING CORONARY BYPASS GRAFT OF NATIVE HEART WITHOUT ANGINA PECTORIS: ICD-10-CM

## 2022-10-24 LAB — POC MICROALBUMIN URINE: 20

## 2022-10-24 PROCEDURE — 1159F MED LIST DOCD IN RCRD: CPT | Performed by: FAMILY MEDICINE

## 2022-10-24 PROCEDURE — 1126F AMNT PAIN NOTED NONE PRSNT: CPT | Performed by: FAMILY MEDICINE

## 2022-10-24 PROCEDURE — 99214 OFFICE O/P EST MOD 30 MIN: CPT | Performed by: FAMILY MEDICINE

## 2022-10-24 PROCEDURE — G0439 PPPS, SUBSEQ VISIT: HCPCS | Performed by: FAMILY MEDICINE

## 2022-10-24 PROCEDURE — 82044 UR ALBUMIN SEMIQUANTITATIVE: CPT | Performed by: FAMILY MEDICINE

## 2022-10-24 PROCEDURE — 1170F FXNL STATUS ASSESSED: CPT | Performed by: FAMILY MEDICINE

## 2022-10-24 RX ORDER — ATORVASTATIN CALCIUM 40 MG/1
40 TABLET, FILM COATED ORAL DAILY
Qty: 90 TABLET | Refills: 1 | Status: SHIPPED | OUTPATIENT
Start: 2022-10-24

## 2022-10-24 RX ORDER — BISOPROLOL FUMARATE 5 MG/1
5 TABLET, FILM COATED ORAL DAILY
Qty: 90 TABLET | Refills: 1 | Status: SHIPPED | OUTPATIENT
Start: 2022-10-24

## 2022-10-24 RX ORDER — COLESEVELAM 180 1/1
1875 TABLET ORAL 2 TIMES DAILY WITH MEALS
Qty: 540 TABLET | Refills: 1 | Status: SHIPPED | OUTPATIENT
Start: 2022-10-24 | End: 2023-02-28

## 2022-10-24 RX ORDER — VALSARTAN 160 MG/1
160 TABLET ORAL DAILY
Qty: 90 TABLET | Refills: 1 | Status: SHIPPED | OUTPATIENT
Start: 2022-10-24

## 2022-10-24 RX ORDER — LEVOTHYROXINE SODIUM 0.07 MG/1
75 TABLET ORAL DAILY
Qty: 90 TABLET | Refills: 1 | Status: SHIPPED | OUTPATIENT
Start: 2022-10-24

## 2022-10-24 NOTE — PROGRESS NOTES
The ABCs of the Annual Wellness Visit  Subsequent Medicare Wellness Visit    Chief Complaint   Patient presents with   • Medicare Wellness-subsequent   • lab test results      Subjective    History of Present Illness:  Elisabet Harris is a 67 y.o. female who presents for a Subsequent Medicare Wellness Visit.    She is also here to follow-up on her blood work and chronic medical problems.  She did well on the Zetia except had joint aches muscle aches cholesterol numbers are better however.    We did not do her chemistry her A1c.  She is working on her sugars.  Has been busy gen carrots.    Review of Systems   Constitutional: Negative for fatigue and fever.   Respiratory: Negative for cough and shortness of breath.    Cardiovascular: Negative for chest pain and palpitations.   Skin: Negative for rash or itching      The following portions of the patient's history were reviewed and   updated as appropriate: allergies, current medications, past family history, past medical history, past social history, past surgical history and problem list.    Compared to one year ago, the patient feels her physical   health is the same.    Compared to one year ago, the patient feels her mental   health is the same.    Recent Hospitalizations:  She was not admitted to the hospital during the last year.       Current Medical Providers:  Patient Care Team:  Johny Brantley MD as PCP - General  Johny Brantley MD as PCP - Family Medicine    Outpatient Medications Prior to Visit   Medication Sig Dispense Refill   • amLODIPine (NORVASC) 10 MG tablet Take 1 tablet by mouth Daily. 90 tablet 1   • aspirin 325 MG tablet Take 1 tablet by mouth.     • glimepiride (AMARYL) 4 MG tablet Take  by mouth 2 (two) times a day.     • Insulin Glargine (BASAGLAR KWIKPEN) 100 UNIT/ML injection pen Inject  Units under the skin into the appropriate area as directed Every Night. 30 pen 1   • [employer] Pen Needles 31G X 6 MM misc USE TO INJECT INSULIN AS  DIRECTED DAILY 100 each 3   • metFORMIN (GLUCOPHAGE) 850 MG tablet 1 po  Tid  w  meals 270 tablet 1   • pioglitazone (Actos) 30 MG tablet Take 1 tablet by mouth Daily. 90 tablet 0   • SITagliptin (Januvia) 100 MG tablet Take 1 tablet by mouth Daily. 90 tablet 1   • amLODIPine (NORVASC) 5 MG tablet Take  by mouth 2 (two) times a day.     • atorvastatin (LIPITOR) 40 MG tablet Take 1 tablet by mouth Daily. 90 tablet 1   • bisoprolol (ZEBeta) 5 MG tablet Take 1 tablet by mouth Daily. 90 tablet 1   • ezetimibe (ZETIA) 10 MG tablet Take 1 tablet by mouth Daily. 90 tablet 1   • levothyroxine (SYNTHROID, LEVOTHROID) 75 MCG tablet TAKE ONE TABLET BY MOUTH DAILY 30 tablet 0   • valsartan (DIOVAN) 160 MG tablet Take 1 tablet by mouth Daily. 90 tablet 1     No facility-administered medications prior to visit.       No opioid medication identified on active medication list. I have reviewed chart for other potential  high risk medication/s and harmful drug interactions in the elderly.          Aspirin is on active medication list. Aspirin use is indicated based on review of current medical condition/s. Pros and cons of this therapy have been discussed today. Benefits of this medication outweigh potential harm.  Patient has been encouraged to continue taking this medication.  .  She has had heart attack in the past so should be on her aspirin daily.    Patient Active Problem List   Diagnosis   • Coronary artery disease involving coronary bypass graft of native heart without angina pectoris   • Arthritis of knee   • Benign hypertension   • Hearing loss   • Hypothyroidism   • Ischemic cardiomyopathy   • Mixed hyperlipidemia   • Obesity (BMI 30.0-34.9)   • Sleep apnea   • Type 2 diabetes mellitus (HCC)     Advance Care Planning  Advance Directive is not on file.  ACP discussion was held with the patient during this visit. Patient does not have an advance directive, information provided.          Objective    Vitals:    10/24/22 0914  "  BP: 134/70   BP Location: Left arm   Patient Position: Sitting   Cuff Size: Large Adult   Pulse: 67   Resp: 15   Temp: 97.1 °F (36.2 °C)   TempSrc: Infrared   SpO2: 98%   Weight: 106 kg (233 lb)   Height: 175.3 cm (69\")   PainSc: 0-No pain     Estimated body mass index is 34.41 kg/m² as calculated from the following:    Height as of this encounter: 175.3 cm (69\").    Weight as of this encounter: 106 kg (233 lb).    BMI is >= 30 and <35. (Class 1 Obesity). The following options were offered after discussion;: exercise counseling/recommendations and nutrition counseling/recommendations      Does the patient have evidence of cognitive impairment? No    Physical Exam  Vitals and nursing note reviewed.   Constitutional:       Appearance: Normal appearance.   HENT:      Head: Normocephalic and atraumatic.   Cardiovascular:      Rate and Rhythm: Normal rate and regular rhythm.      Pulses:           Dorsalis pedis pulses are 2+ on the right side and 2+ on the left side.        Posterior tibial pulses are 0 on the right side and 0 on the left side.   Pulmonary:      Effort: Pulmonary effort is normal.      Breath sounds: Normal breath sounds.   Musculoskeletal:         General: Normal range of motion.      Cervical back: Normal range of motion and neck supple.      Right lower leg: No edema.      Left lower leg: No edema.   Feet:      Right foot:      Protective Sensation: 10 sites tested. 6 sites sensed.      Left foot:      Protective Sensation: 10 sites tested. 8 sites sensed.      Comments: Did not feel the toes on the right foot or the heel and heel on the left foot  She does have hair on the toes and feet.  No signs of ulcers or callus  Skin:     General: Skin is warm and dry.   Neurological:      General: No focal deficit present.      Mental Status: She is alert.       Lab Results   Component Value Date    CHLPL 119 10/18/2022    TRIG 109 10/18/2022    HDL 37 (L) 10/18/2022    LDL 62 10/18/2022    VLDL 20 " 10/18/2022    HGBA1C 8.0 (H) 08/18/2022            HEALTH RISK ASSESSMENT    Smoking Status:  Social History     Tobacco Use   Smoking Status Never   Smokeless Tobacco Never     Alcohol Consumption:  Social History     Substance and Sexual Activity   Alcohol Use No     Fall Risk Screen:    LISBETH Fall Risk Assessment was completed, and patient is at LOW risk for falls.Assessment completed on:10/24/2022    Depression Screening:  PHQ-2/PHQ-9 Depression Screening 10/24/2022   Little Interest or Pleasure in Doing Things 0-->not at all   Feeling Down, Depressed or Hopeless 0-->not at all   PHQ-9: Brief Depression Severity Measure Score 0       Health Habits and Functional and Cognitive Screening:  Functional & Cognitive Status 10/24/2022   Do you have difficulty preparing food and eating? No   Do you have difficulty bathing yourself, getting dressed or grooming yourself? No   Do you have difficulty using the toilet? No   Do you have difficulty moving around from place to place? No   Do you have trouble with steps or getting out of a bed or a chair? No   Current Diet Well Balanced Diet   Dental Exam Up to date   Eye Exam Up to date   Exercise (times per week) 0 times per week   Current Exercises Include No Regular Exercise   Do you need help using the phone?  No   Are you deaf or do you have serious difficulty hearing?  No   Do you need help with transportation? No   Do you need help shopping? No   Do you need help preparing meals?  No   Do you need help with housework?  No   Do you need help with laundry? No   Do you need help taking your medications? No   Do you need help managing money? No   Do you ever drive or ride in a car without wearing a seat belt? No   Have you felt unusual stress, anger or loneliness in the last month? No   Who do you live with? Spouse   If you need help, do you have trouble finding someone available to you? No   Do you have difficulty concentrating, remembering or making decisions? No        Age-appropriate Screening Schedule:  Refer to the list below for future screening recommendations based on patient's age, sex and/or medical conditions. Orders for these recommended tests are listed in the plan section. The patient has been provided with a written plan.    Health Maintenance   Topic Date Due   • DIABETIC FOOT EXAM  Never done   • DIABETIC EYE EXAM  Never done   • URINE MICROALBUMIN  04/13/2022   • HEMOGLOBIN A1C  02/18/2023   • MAMMOGRAM  05/19/2023   • DXA SCAN  05/19/2023   • LIPID PANEL  10/18/2023   • TDAP/TD VACCINES (2 - Td or Tdap) 10/07/2026   • INFLUENZA VACCINE  Completed   • ZOSTER VACCINE  Completed              Assessment & Plan   CMS Preventative Services Quick Reference  Risk Factors Identified During Encounter  Fall Risk-High or Moderate  Inactivity/Sedentary  Obesity/Overweight   The above risks/problems have been discussed with the patient.  Follow up actions/plans if indicated are seen below in the Assessment/Plan Section.  Pertinent information has been shared with the patient in the After Visit Summary.    Diagnoses and all orders for this visit:    1. Encounter for subsequent annual wellness visit (AWV) in Medicare patient (Primary)    2. Coronary artery disease involving coronary bypass graft of native heart without angina pectoris  -     atorvastatin (LIPITOR) 40 MG tablet; Take 1 tablet by mouth Daily.  Dispense: 90 tablet; Refill: 1  -     bisoprolol (ZEBeta) 5 MG tablet; Take 1 tablet by mouth Daily.  Dispense: 90 tablet; Refill: 1    3. Benign hypertension  -     valsartan (DIOVAN) 160 MG tablet; Take 1 tablet by mouth Daily.  Dispense: 90 tablet; Refill: 1    4. Type 2 diabetes mellitus with hyperglycemia, with long-term current use of insulin (HCC)  -     POCT microalbumin    5. Mixed hyperlipidemia  -     colesevelam (Welchol) 625 MG tablet; Take 3 tablets by mouth 2 (Two) Times a Day With Meals. Or  6   Tabs  Qd w  meal  Dispense: 540 tablet; Refill: 1  -      CK; Future  -     Comprehensive Metabolic Panel; Future  -     Lipid Panel; Future    6. Hypothyroidism, unspecified type  -     levothyroxine (SYNTHROID, LEVOTHROID) 75 MCG tablet; Take 1 tablet by mouth Daily.  Dispense: 90 tablet; Refill: 1      We will stop the Zetia add in WelChol she knows to take this by itself without her other medicines for 4 hours and to take her vitamins at a different time as well.  Risks of constipation loss of fat-soluble vitamins discussed with her.  We will follow-up blood work in a couple months.    Continue Diovan and Lipitor.  Zebeta refilled as well.    We will check a microalbumin today diabetic foot exam was done.  Risk, benefits, side effects of medicines discussed with her.  Follow Up:   Return in about 8 weeks (around 12/22/2022) for Recheck, Labs prior next visit.     An After Visit Summary and PPPS were made available to the patient.

## 2022-11-12 DIAGNOSIS — E11.65 TYPE 2 DIABETES MELLITUS WITH HYPERGLYCEMIA, WITH LONG-TERM CURRENT USE OF INSULIN: ICD-10-CM

## 2022-11-12 DIAGNOSIS — Z79.4 TYPE 2 DIABETES MELLITUS WITH HYPERGLYCEMIA, WITH LONG-TERM CURRENT USE OF INSULIN: ICD-10-CM

## 2022-11-15 RX ORDER — PIOGLITAZONEHYDROCHLORIDE 30 MG/1
TABLET ORAL
Qty: 90 TABLET | Refills: 0 | Status: SHIPPED | OUTPATIENT
Start: 2022-11-15 | End: 2023-02-06

## 2022-12-13 ENCOUNTER — TELEPHONE (OUTPATIENT)
Dept: FAMILY MEDICINE CLINIC | Facility: CLINIC | Age: 67
End: 2022-12-13

## 2022-12-15 ENCOUNTER — LAB (OUTPATIENT)
Dept: FAMILY MEDICINE CLINIC | Facility: CLINIC | Age: 67
End: 2022-12-15

## 2022-12-15 DIAGNOSIS — E11.65 TYPE 2 DIABETES MELLITUS WITH HYPERGLYCEMIA, WITH LONG-TERM CURRENT USE OF INSULIN: ICD-10-CM

## 2022-12-15 DIAGNOSIS — E03.9 HYPOTHYROIDISM, UNSPECIFIED TYPE: ICD-10-CM

## 2022-12-15 DIAGNOSIS — Z79.4 TYPE 2 DIABETES MELLITUS WITH HYPERGLYCEMIA, WITH LONG-TERM CURRENT USE OF INSULIN: ICD-10-CM

## 2022-12-15 DIAGNOSIS — E78.2 MIXED HYPERLIPIDEMIA: ICD-10-CM

## 2022-12-15 DIAGNOSIS — Z79.899 HIGH RISK MEDICATION USE: ICD-10-CM

## 2022-12-15 PROCEDURE — 36415 COLL VENOUS BLD VENIPUNCTURE: CPT | Performed by: FAMILY MEDICINE

## 2022-12-16 LAB
ALBUMIN SERPL-MCNC: NORMAL G/DL
ALP SERPL-CCNC: NORMAL U/L
ALT SERPL-CCNC: NORMAL U/L
AST SERPL-CCNC: NORMAL U/L
BILIRUB SERPL-MCNC: NORMAL MG/DL
BUN SERPL-MCNC: NORMAL MG/DL
CALCIUM SERPL-MCNC: NORMAL MG/DL
CHLORIDE SERPL-SCNC: NORMAL MMOL/L
CHOLEST SERPL-MCNC: NORMAL MG/DL
CK SERPL-CCNC: NORMAL U/L
CO2 SERPL-SCNC: NORMAL MMOL/L
CREAT SERPL-MCNC: NORMAL MG/DL
GLUCOSE SERPL-MCNC: NORMAL MG/DL
HBA1C MFR BLD: 7 % (ref 4.8–5.6)
HDLC SERPL-MCNC: NORMAL MG/DL
POTASSIUM SERPL-SCNC: NORMAL MMOL/L
PROT SERPL-MCNC: NORMAL G/DL
SODIUM SERPL-SCNC: NORMAL MMOL/L
TRIGL SERPL-MCNC: NORMAL MG/DL
TSH SERPL DL<=0.005 MIU/L-ACNC: 2.48 UIU/ML (ref 0.45–4.5)
VLDLC SERPL CALC-MCNC: NORMAL MG/DL

## 2022-12-17 LAB
ALBUMIN SERPL-MCNC: NORMAL G/DL
ALP SERPL-CCNC: NORMAL U/L
ALT SERPL-CCNC: NORMAL U/L
AST SERPL-CCNC: NORMAL U/L
BILIRUB SERPL-MCNC: NORMAL MG/DL
BUN SERPL-MCNC: NORMAL MG/DL
CALCIUM SERPL-MCNC: NORMAL MG/DL
CHLORIDE SERPL-SCNC: NORMAL MMOL/L
CHOLEST SERPL-MCNC: NORMAL MG/DL
CK SERPL-CCNC: NORMAL U/L
CO2 SERPL-SCNC: NORMAL MMOL/L
CREAT SERPL-MCNC: NORMAL MG/DL
GLUCOSE SERPL-MCNC: NORMAL MG/DL
HDLC SERPL-MCNC: NORMAL MG/DL
POTASSIUM SERPL-SCNC: NORMAL MMOL/L
PROT SERPL-MCNC: NORMAL G/DL
SODIUM SERPL-SCNC: NORMAL MMOL/L
SPECIMEN STATUS: NORMAL
TRIGL SERPL-MCNC: NORMAL MG/DL
VLDLC SERPL CALC-MCNC: NORMAL MG/DL

## 2022-12-19 ENCOUNTER — TELEPHONE (OUTPATIENT)
Dept: FAMILY MEDICINE CLINIC | Facility: CLINIC | Age: 67
End: 2022-12-19
Payer: COMMERCIAL

## 2022-12-19 NOTE — TELEPHONE ENCOUNTER
----- Message from Johny Brantley MD sent at 12/18/2022 11:02 PM EST -----  Your labs/ tests are abnormal --please make appointment to discuss.    Where are the chemistry profiles the other blood work?

## 2022-12-20 RX ORDER — CHOLESTYRAMINE 4 G/9G
1 POWDER, FOR SUSPENSION ORAL 2 TIMES DAILY WITH MEALS
Qty: 60 PACKET | Refills: 0 | Status: SHIPPED | OUTPATIENT
Start: 2022-12-20 | End: 2023-02-28

## 2022-12-20 NOTE — TELEPHONE ENCOUNTER
Rescheduled Appt:     Patient was originally coming in today to go over BW and ask Dr. Brantley to switch her COLESEVELAM 625 MG med from a pill form to the powder form bc she has to take 6 pills/ day.     [phone]  [facility]

## 2023-01-12 ENCOUNTER — TELEPHONE (OUTPATIENT)
Dept: FAMILY MEDICINE CLINIC | Facility: CLINIC | Age: 68
End: 2023-01-12

## 2023-01-12 NOTE — TELEPHONE ENCOUNTER
Provider: YANIV    Caller: DARRICK TRACY    Phone Number: [phone]    Reason for Call: PATIENT CALLED TO CANCEL APPOINTMENT.  AFTER SEEING THE NEXT APPOINTMENT IS NOT UNTIL MARCH, SHE ASKED IF SHE NEEDS TO KEEP THIS APPOINTMENT WITH DR. PURVIS?

## 2023-02-05 DIAGNOSIS — E11.65 TYPE 2 DIABETES MELLITUS WITH HYPERGLYCEMIA, WITH LONG-TERM CURRENT USE OF INSULIN: ICD-10-CM

## 2023-02-05 DIAGNOSIS — I10 PRIMARY HYPERTENSION: ICD-10-CM

## 2023-02-05 DIAGNOSIS — Z79.4 TYPE 2 DIABETES MELLITUS WITH HYPERGLYCEMIA, WITH LONG-TERM CURRENT USE OF INSULIN: ICD-10-CM

## 2023-02-06 RX ORDER — GLIMEPIRIDE 4 MG/1
TABLET ORAL
Qty: 60 TABLET | Refills: 0 | Status: SHIPPED | OUTPATIENT
Start: 2023-02-06 | End: 2023-02-28 | Stop reason: SDUPTHER

## 2023-02-06 RX ORDER — AMLODIPINE BESYLATE 10 MG/1
TABLET ORAL
Qty: 30 TABLET | Refills: 0 | Status: SHIPPED | OUTPATIENT
Start: 2023-02-06 | End: 2023-02-28 | Stop reason: SDUPTHER

## 2023-02-06 RX ORDER — PIOGLITAZONEHYDROCHLORIDE 30 MG/1
TABLET ORAL
Qty: 30 TABLET | Refills: 0 | Status: SHIPPED | OUTPATIENT
Start: 2023-02-06 | End: 2023-02-28 | Stop reason: SDUPTHER

## 2023-02-10 DIAGNOSIS — Z79.4 TYPE 2 DIABETES MELLITUS WITH HYPERGLYCEMIA, WITH LONG-TERM CURRENT USE OF INSULIN: ICD-10-CM

## 2023-02-10 DIAGNOSIS — E11.65 TYPE 2 DIABETES MELLITUS WITH HYPERGLYCEMIA, WITH LONG-TERM CURRENT USE OF INSULIN: ICD-10-CM

## 2023-02-10 RX ORDER — INSULIN GLARGINE 100 [IU]/ML
INJECTION, SOLUTION SUBCUTANEOUS
Qty: 75 ML | Refills: 0 | Status: SHIPPED | OUTPATIENT
Start: 2023-02-10 | End: 2023-02-28 | Stop reason: SDUPTHER

## 2023-02-10 NOTE — TELEPHONE ENCOUNTER
Rx Refill Note    Requested Prescriptions     Pending Prescriptions Disp Refills   • Insulin Glargine (BASAGLAR KWIKPEN) 100 UNIT/ML injection pen [Pharmacy Med Name: BASAGLAR 100 UNIT/ML KWIKPEN] 75 mL 0     Sig: INJECT  UNITS UNDER THE SKIN  ONCE NIGHTLY        Last office visit with prescribing clinician: 10/24/2022      Next office visit with prescribing clinician: Visit date not found   Last labs:   Last refill: needs   Pharmacy (be sure to add in Epic). correct

## 2023-02-28 ENCOUNTER — OFFICE VISIT (OUTPATIENT)
Dept: FAMILY MEDICINE CLINIC | Facility: CLINIC | Age: 68
End: 2023-02-28
Payer: MEDICARE

## 2023-02-28 VITALS
DIASTOLIC BLOOD PRESSURE: 66 MMHG | RESPIRATION RATE: 15 BRPM | OXYGEN SATURATION: 93 % | BODY MASS INDEX: 34.51 KG/M2 | TEMPERATURE: 97.1 F | HEIGHT: 69 IN | SYSTOLIC BLOOD PRESSURE: 136 MMHG | WEIGHT: 233 LBS | HEART RATE: 75 BPM

## 2023-02-28 DIAGNOSIS — I10 PRIMARY HYPERTENSION: ICD-10-CM

## 2023-02-28 DIAGNOSIS — Z79.899 HIGH RISK MEDICATION USE: ICD-10-CM

## 2023-02-28 DIAGNOSIS — E11.65 TYPE 2 DIABETES MELLITUS WITH HYPERGLYCEMIA, WITH LONG-TERM CURRENT USE OF INSULIN: Primary | ICD-10-CM

## 2023-02-28 DIAGNOSIS — Z79.4 TYPE 2 DIABETES MELLITUS WITH HYPERGLYCEMIA, WITH LONG-TERM CURRENT USE OF INSULIN: Primary | ICD-10-CM

## 2023-02-28 DIAGNOSIS — E78.2 MIXED HYPERLIPIDEMIA: ICD-10-CM

## 2023-02-28 PROCEDURE — 36415 COLL VENOUS BLD VENIPUNCTURE: CPT | Performed by: FAMILY MEDICINE

## 2023-02-28 PROCEDURE — 99214 OFFICE O/P EST MOD 30 MIN: CPT | Performed by: FAMILY MEDICINE

## 2023-02-28 RX ORDER — COLESEVELAM HYDROCHLORIDE 3.75 G/1
3.75 POWDER, FOR SUSPENSION ORAL DAILY
Qty: 90 EACH | Refills: 1 | Status: SHIPPED | OUTPATIENT
Start: 2023-02-28

## 2023-02-28 RX ORDER — GLIMEPIRIDE 4 MG/1
4 TABLET ORAL 2 TIMES DAILY
Qty: 180 TABLET | Refills: 1 | Status: SHIPPED | OUTPATIENT
Start: 2023-02-28

## 2023-02-28 RX ORDER — AMLODIPINE BESYLATE 10 MG/1
10 TABLET ORAL DAILY
Qty: 90 TABLET | Refills: 1 | Status: SHIPPED | OUTPATIENT
Start: 2023-02-28

## 2023-02-28 RX ORDER — PIOGLITAZONEHYDROCHLORIDE 30 MG/1
30 TABLET ORAL DAILY
Qty: 90 TABLET | Refills: 1 | Status: SHIPPED | OUTPATIENT
Start: 2023-02-28

## 2023-02-28 RX ORDER — INSULIN GLARGINE 100 [IU]/ML
INJECTION, SOLUTION SUBCUTANEOUS
Qty: 75 ML | Refills: 1 | Status: SHIPPED | OUTPATIENT
Start: 2023-02-28

## 2023-02-28 NOTE — PROGRESS NOTES
Follow Up Office Visit      Patient Name: Elisabet Harris  : 1955   MRN: [id number]     Chief Complaint:    Chief Complaint   Patient presents with   • Follow-up       History of Present Illness: Elisabet Harris is a 67 y.o. female who is here today to   follow-up on her diabetes and chronic medical problems.  She is doing well unfortunately they did not do the CMP last time.  But her A1c was much better 7.0 she likes to WelChol except did not like taking 6 tablets so they wanted to be switched to powder so they switched her over to cholestyramine when I was out sick.  She has been taking this 1 time a day the 2 packets.  With pineapple juice or orange juice.  And she is doing well still struggling with her right knee not able to exercise or walk much.  Has just a trace bit of swelling in her legs where her socks leave a little imprint.    Due to pharmacy issues she has been out of her Januvia for a week or so they cannot get it in.  And she needs her other medicines refilled    Review of Systems   Constitutional: Negative for fatigue and fever.   Respiratory: Negative for cough and shortness of breath.    Cardiovascular: Negative for chest pain and palpitations.   Skin: Negative for rash or itching      Subjective      Review of Systems:   Review of Systems    Past Medical History:   Past Medical History:   Diagnosis Date   • Atrial fibrillation (HCC)     post op   • Coronary artery disease    • Diabetes mellitus (HCC)    • Dyslipidemia    • Hypertension    • Hypothyroidism        Past Surgical History:   Past Surgical History:   Procedure Laterality Date   • CORONARY ARTERY BYPASS GRAFT  2016   • CORONARY STENT PLACEMENT         • HYSTERECTOMY         Family History:   Family History   Problem Relation Age of Onset   • Mitral valve prolapse Mother    • Heart failure Mother    • Heart attack Father    • Prostate cancer Father    • Glaucoma Sister    • Hyperlipidemia Brother        Social  History:   Social History     Socioeconomic History   • Marital status:    • Number of children: 1   Tobacco Use   • Smoking status: Never   • Smokeless tobacco: Never   Vaping Use   • Vaping Use: Never used   Substance and Sexual Activity   • Alcohol use: No   • Drug use: No   • Sexual activity: Defer       Medications:     Current Outpatient Medications:   •  amLODIPine (NORVASC) 10 MG tablet, Take 1 tablet by mouth Daily., Disp: 90 tablet, Rfl: 1  •  glimepiride (AMARYL) 4 MG tablet, Take 1 tablet by mouth 2 (Two) Times a Day., Disp: 180 tablet, Rfl: 1  •  Insulin Glargine (BASAGLAR KWIKPEN) 100 UNIT/ML injection pen, 50-95 units SQ  Qd as dir, Disp: 75 mL, Rfl: 1  •  metFORMIN (GLUCOPHAGE) 850 MG tablet, 1 po  Tid  w  meals, Disp: 270 tablet, Rfl: 1  •  pioglitazone (ACTOS) 30 MG tablet, Take 1 tablet by mouth Daily., Disp: 90 tablet, Rfl: 1  •  SITagliptin (Januvia) 100 MG tablet, Take 1 tablet by mouth Daily., Disp: 90 tablet, Rfl: 1  •  aspirin 325 MG tablet, Take 1 tablet by mouth., Disp: , Rfl:   •  atorvastatin (LIPITOR) 40 MG tablet, Take 1 tablet by mouth Daily., Disp: 90 tablet, Rfl: 1  •  bisoprolol (ZEBeta) 5 MG tablet, Take 1 tablet by mouth Daily., Disp: 90 tablet, Rfl: 1  •  colesevelam (Welchol) 3.75 g pack pack, Take 1 packet by mouth Daily., Disp: 90 each, Rfl: 1  •  [employer] Pen Needles 31G X 6 MM misc, USE TO INJECT INSULIN AS DIRECTED DAILY, Disp: 100 each, Rfl: 3  •  levothyroxine (SYNTHROID, LEVOTHROID) 75 MCG tablet, Take 1 tablet by mouth Daily., Disp: 90 tablet, Rfl: 1  •  valsartan (DIOVAN) 160 MG tablet, Take 1 tablet by mouth Daily., Disp: 90 tablet, Rfl: 1    Allergies:   Allergies   Allergen Reactions   • Invokana [Canagliflozin] Unknown - Low Severity   • Victoza [Liraglutide] Itching and Rash       Objective     Physical Exam:  Vital Signs:   Vitals:    02/28/23 1116   BP: 136/66   BP Location: Left arm   Patient Position: Sitting   Cuff Size: Large Adult   Pulse: 75   Resp:  "15   Temp: 97.1 °F (36.2 °C)   TempSrc: Infrared   SpO2: 93%   Weight: 106 kg (233 lb)   Height: 175.3 cm (69\")   PainSc: 0-No pain     Body mass index is 34.41 kg/m².     Physical Exam  Vitals and nursing note reviewed.   Constitutional:       Appearance: Normal appearance.   HENT:      Head: Normocephalic and atraumatic.   Cardiovascular:      Rate and Rhythm: Normal rate and regular rhythm.   Pulmonary:      Effort: Pulmonary effort is normal.      Breath sounds: Normal breath sounds.   Musculoskeletal:         General: Normal range of motion.      Cervical back: Normal range of motion and neck supple.      Right lower leg: No edema.      Left lower leg: No edema.      Comments: Just a very minimal)) with the ankle socks   Skin:     General: Skin is warm and dry.   Neurological:      General: No focal deficit present.      Mental Status: She is alert.         Procedures    PHQ-9 Total Score:       Assessment / Plan      Assessment/Plan:   Diagnoses and all orders for this visit:    1. Type 2 diabetes mellitus with hyperglycemia, with long-term current use of insulin (HCC) (Primary)  -     Insulin Glargine (BASAGLAR KWIKPEN) 100 UNIT/ML injection pen; 50-95 units SQ  Qd as dir  Dispense: 75 mL; Refill: 1  -     pioglitazone (ACTOS) 30 MG tablet; Take 1 tablet by mouth Daily.  Dispense: 90 tablet; Refill: 1  -     Comprehensive Metabolic Panel; Future  -     Comprehensive Metabolic Panel; Future  -     POC Glycosylated Hemoglobin (Hb A1C); Future  -     metFORMIN (GLUCOPHAGE) 850 MG tablet; 1 po  Tid  w  meals  Dispense: 270 tablet; Refill: 1  -     Discontinue: SITagliptin (Januvia) 100 MG tablet; Take 1 tablet by mouth Daily.  Dispense: 90 tablet; Refill: 1  -     colesevelam (Welchol) 3.75 g pack pack; Take 1 packet by mouth Daily.  Dispense: 90 each; Refill: 1  -     SITagliptin (Januvia) 100 MG tablet; Take 1 tablet by mouth Daily.  Dispense: 90 tablet; Refill: 1  -     Comprehensive Metabolic Panel    2. " Primary hypertension  -     amLODIPine (NORVASC) 10 MG tablet; Take 1 tablet by mouth Daily.  Dispense: 90 tablet; Refill: 1    3. High risk medication use  -     Comprehensive Metabolic Panel; Future  -     CK; Future  -     Comprehensive Metabolic Panel; Future  -     Comprehensive Metabolic Panel  -     CBC Auto Differential; Future    4. Mixed hyperlipidemia  -     Lipid Panel; Future    Other orders  -     glimepiride (AMARYL) 4 MG tablet; Take 1 tablet by mouth 2 (Two) Times a Day.  Dispense: 180 tablet; Refill: 1         Continue meds as directed we will switch her back over to WelChol powder as this gives her better benefit with her sugars compared to the cholestyramine and then we will follow-up in 3 months we will get a CMP and an A1c rapid test today.    Encouraged her to get on the recumbent bike she is in to see if her  will bring it upstairs for her and if she has any problems she will let us know    Continue good diet cut back on carbs continue her insulin she has been running about 50 units since being on the WelChol she dropped her dose previously was up to 75 or 85 units a day.  Labs reviewed with her    BMI is >= 30 and <35. (Class 1 Obesity). The following options were offered after discussion;: exercise counseling/recommendations and nutrition counseling/recommendations      Follow Up:   Return in about 3 months (around 5/28/2023) for Recheck, Labs prior next visit.        Johny Brantley MD  [facility]   Portions of note created with Dragon voice recognition technology

## 2023-03-01 LAB
ALBUMIN SERPL-MCNC: 4.4 G/DL (ref 3.8–4.8)
ALBUMIN/GLOB SERPL: 1.8 {RATIO} (ref 1.2–2.2)
ALP SERPL-CCNC: 90 IU/L (ref 44–121)
ALT SERPL-CCNC: 24 IU/L (ref 0–32)
AST SERPL-CCNC: 23 IU/L (ref 0–40)
BILIRUB SERPL-MCNC: 0.4 MG/DL (ref 0–1.2)
BUN SERPL-MCNC: 21 MG/DL (ref 8–27)
BUN/CREAT SERPL: 20 (ref 12–28)
CALCIUM SERPL-MCNC: 9.5 MG/DL (ref 8.7–10.3)
CHLORIDE SERPL-SCNC: 105 MMOL/L (ref 96–106)
CO2 SERPL-SCNC: 23 MMOL/L (ref 20–29)
CREAT SERPL-MCNC: 1.05 MG/DL (ref 0.57–1)
EGFRCR SERPLBLD CKD-EPI 2021: 58 ML/MIN/1.73
GLOBULIN SER CALC-MCNC: 2.4 G/DL (ref 1.5–4.5)
GLUCOSE SERPL-MCNC: 271 MG/DL (ref 70–99)
POTASSIUM SERPL-SCNC: 4.9 MMOL/L (ref 3.5–5.2)
PROT SERPL-MCNC: 6.8 G/DL (ref 6–8.5)
SODIUM SERPL-SCNC: 145 MMOL/L (ref 134–144)

## 2023-05-11 DIAGNOSIS — E03.9 HYPOTHYROIDISM, UNSPECIFIED TYPE: ICD-10-CM

## 2023-05-11 RX ORDER — LEVOTHYROXINE SODIUM 0.07 MG/1
TABLET ORAL
Qty: 90 TABLET | Refills: 1 | Status: SHIPPED | OUTPATIENT
Start: 2023-05-11

## 2023-05-11 NOTE — TELEPHONE ENCOUNTER
Rx Refill Note    Requested Prescriptions     Pending Prescriptions Disp Refills   • levothyroxine (SYNTHROID, LEVOTHROID) 75 MCG tablet [Pharmacy Med Name: LEVOTHYROXINE 75 MCG TABLET] 90 tablet 0     Sig: TAKE ONE TABLET BY MOUTH DAILY        Last office visit with prescribing clinician: 2/28/2023      Next office visit with prescribing clinician: Visit date not found   Last labs:   Last refill: 10/24/2022   Pharmacy (be sure to add in Epic). correct

## 2023-06-05 ENCOUNTER — TELEPHONE (OUTPATIENT)
Dept: FAMILY MEDICINE CLINIC | Facility: CLINIC | Age: 68
End: 2023-06-05

## 2023-06-05 NOTE — TELEPHONE ENCOUNTER
Caller: Stacy Braun    Relationship: Emergency Contact    Best call back number: [phone]       What is the best time to reach you: ANYTIME    Who are you requesting to speak with (clinical staff, provider,  specific staff member): DR PURVIS    Do you know the name of the person who called: STACY WHITLOCK/EUGENIE    What was the call regarding: WENT TO ER FOR KIDNEY STONE, PATIENT IS HAVING PROCEDURE FOR REMOVAL    Is it okay if the provider responds through MyChart: PHONE CALL

## 2023-06-06 ENCOUNTER — READMISSION MANAGEMENT (OUTPATIENT)
Dept: CALL CENTER | Facility: HOSPITAL | Age: 68
End: 2023-06-06
Payer: COMMERCIAL

## 2023-06-06 NOTE — OUTREACH NOTE
Prep Survey      Flowsheet Row Responses   Denominational facility patient discharged from? Non-BH   Is LACE score < 7 ? Non-BH Discharge   Eligibility [facility]   Date of Discharge 06/06/23   Discharge Disposition Home or Self Care   Discharge diagnosis ACUTE KIDNEY INJURY   Does the patient have one of the following disease processes/diagnoses(primary or secondary)? Other   Prep survey completed? Yes            Alyx SANTILLAN - Registered Nurse

## 2023-06-07 ENCOUNTER — OFFICE VISIT (OUTPATIENT)
Dept: FAMILY MEDICINE CLINIC | Facility: CLINIC | Age: 68
End: 2023-06-07
Payer: MEDICARE

## 2023-06-07 ENCOUNTER — TRANSITIONAL CARE MANAGEMENT TELEPHONE ENCOUNTER (OUTPATIENT)
Dept: CALL CENTER | Facility: HOSPITAL | Age: 68
End: 2023-06-07
Payer: COMMERCIAL

## 2023-06-07 VITALS
DIASTOLIC BLOOD PRESSURE: 52 MMHG | TEMPERATURE: 97.3 F | OXYGEN SATURATION: 94 % | HEART RATE: 70 BPM | HEIGHT: 69 IN | RESPIRATION RATE: 21 BRPM | WEIGHT: 241.1 LBS | SYSTOLIC BLOOD PRESSURE: 138 MMHG | BODY MASS INDEX: 35.71 KG/M2

## 2023-06-07 DIAGNOSIS — N20.0 NEPHROLITHIASIS: Primary | ICD-10-CM

## 2023-06-07 DIAGNOSIS — I25.10 CORONARY ARTERY DISEASE INVOLVING NATIVE CORONARY ARTERY OF NATIVE HEART WITHOUT ANGINA PECTORIS: ICD-10-CM

## 2023-06-07 DIAGNOSIS — I10 PRIMARY HYPERTENSION: ICD-10-CM

## 2023-06-07 DIAGNOSIS — E11.65 TYPE 2 DIABETES MELLITUS WITH HYPERGLYCEMIA, WITH LONG-TERM CURRENT USE OF INSULIN: ICD-10-CM

## 2023-06-07 DIAGNOSIS — N17.9 ACUTE KIDNEY INJURY: ICD-10-CM

## 2023-06-07 DIAGNOSIS — Z79.4 TYPE 2 DIABETES MELLITUS WITH HYPERGLYCEMIA, WITH LONG-TERM CURRENT USE OF INSULIN: ICD-10-CM

## 2023-06-07 DIAGNOSIS — E03.9 HYPOTHYROIDISM, UNSPECIFIED TYPE: ICD-10-CM

## 2023-06-07 RX ORDER — PEN NEEDLE, DIABETIC 31 GX5/16"
NEEDLE, DISPOSABLE MISCELLANEOUS
COMMUNITY
Start: 2023-03-29

## 2023-06-07 RX ORDER — OXYBUTYNIN CHLORIDE 5 MG/1
TABLET ORAL
COMMUNITY
Start: 2023-06-05

## 2023-06-07 RX ORDER — TAMSULOSIN HYDROCHLORIDE 0.4 MG/1
CAPSULE ORAL
COMMUNITY
Start: 2023-06-05

## 2023-06-07 RX ORDER — PHENAZOPYRIDINE HYDROCHLORIDE 200 MG/1
TABLET, FILM COATED ORAL
COMMUNITY
Start: 2023-06-05

## 2023-06-07 RX ORDER — HYDROCODONE BITARTRATE AND ACETAMINOPHEN 5; 325 MG/1; MG/1
TABLET ORAL
COMMUNITY
Start: 2023-06-05

## 2023-06-07 NOTE — PROGRESS NOTES
Follow Up Office Visit      Patient Name: Elisabet Harris  : 1955   MRN: [id number]     Chief Complaint:    Chief Complaint   Patient presents with    Follow-up     ER F/U-acute kidney injury       History of Present Illness: Elisabet Harris is a 67 y.o. female who is here today to   for hospital follow-up/TCM after recent hospitalization where she was admitted with obstructing right calculus 4 mm acute kidney injury, coronary artery disease, diabetes, hypertension, hypothyroidism.  She was admitted on  and discharged  hospital discharge summary and CAT scan reviewed    CT of abdomen and pelvis showed a 3 to 4 mm obstructing calculus in the mid right ureter moderate PYELOcaliectasis on the right kidney, atherosclerotic cardiovascular disease, degenerative anterolisthesis of L4 on L5.    Elisabet was admitted with IV fluids and pain control and urology performed cystoscopy ureteroscopy with laser lithotripsy, stone basket extraction, right retrograde pyelogram and right stent placement by Dr. Dariusz Asher  She felt much better after the procedure and is doing well at home now feels better getting into the pool and swimming a bit she does have a little bit of swelling of her fingers she is eating and drinking okay has had no fever.  She says she has had maybe a little bit of blood but her urine is orange due to the Pyridium she is taking    Discharge medications:   hydrocodone 5/325 1 p.o. every 6 hours PRN  Oxybutynin chloride 5 mg 3 times daily as needed bladder spasms  Phenazopyridine 200 mg 3 times daily  Tamsulosin 0.4 mg per 20 given    Creatinine was 1.32 with a BUN of 20  Glucose 327 on admission  White count 7.7 hemoglobin 10 hematocrit 32 platelet count 316    Subjective      Review of Systems:   Review of Systems    Past Medical History:   Past Medical History:   Diagnosis Date    Atrial fibrillation     post op    Coronary artery disease     Diabetes mellitus     Dyslipidemia      Hypertension     Hypothyroidism        Past Surgical History:   Past Surgical History:   Procedure Laterality Date    CORONARY ARTERY BYPASS GRAFT  04/07/2016    CORONARY STENT PLACEMENT      2003    HYSTERECTOMY  1995       Family History:   Family History   Problem Relation Age of Onset    Mitral valve prolapse Mother     Heart failure Mother     Heart attack Father     Prostate cancer Father     Glaucoma Sister     Hyperlipidemia Brother        Social History:   Social History     Socioeconomic History    Marital status:     Number of children: 1   Tobacco Use    Smoking status: Never    Smokeless tobacco: Never   Vaping Use    Vaping Use: Never used   Substance and Sexual Activity    Alcohol use: No    Drug use: No    Sexual activity: Defer       Medications:     Current Outpatient Medications:     amLODIPine (NORVASC) 10 MG tablet, Take 1 tablet by mouth Daily., Disp: 90 tablet, Rfl: 1    aspirin 325 MG tablet, Take 1 tablet by mouth., Disp: , Rfl:     atorvastatin (LIPITOR) 40 MG tablet, Take 1 tablet by mouth Daily., Disp: 90 tablet, Rfl: 1    bisoprolol (ZEBeta) 5 MG tablet, Take 1 tablet by mouth Daily., Disp: 90 tablet, Rfl: 1    colesevelam (Welchol) 3.75 g pack pack, Take 1 packet by mouth Daily., Disp: 90 each, Rfl: 1    Droplet Pen Needles 31G X 8 MM misc, , Disp: , Rfl:     glimepiride (AMARYL) 4 MG tablet, Take 1 tablet by mouth 2 (Two) Times a Day., Disp: 180 tablet, Rfl: 1    HYDROcodone-acetaminophen (NORCO) 5-325 MG per tablet, , Disp: , Rfl:     Insulin Glargine (BASAGLAR KWIKPEN) 100 UNIT/ML injection pen, 50-95 units SQ  Qd as dir, Disp: 75 mL, Rfl: 1    [employer] Pen Needles 31G X 6 MM misc, USE TO INJECT INSULIN AS DIRECTED DAILY, Disp: 100 each, Rfl: 3    levothyroxine (SYNTHROID, LEVOTHROID) 75 MCG tablet, TAKE ONE TABLET BY MOUTH DAILY, Disp: 90 tablet, Rfl: 1    metFORMIN (GLUCOPHAGE) 850 MG tablet, 1 po  Tid  w  meals, Disp: 270 tablet, Rfl: 1    oxybutynin (DITROPAN) 5 MG tablet, ,  "Disp: , Rfl:     phenazopyridine (PYRIDIUM) 200 MG tablet, , Disp: , Rfl:     pioglitazone (ACTOS) 30 MG tablet, Take 1 tablet by mouth Daily., Disp: 90 tablet, Rfl: 1    SITagliptin (Januvia) 100 MG tablet, Take 1 tablet by mouth Daily., Disp: 90 tablet, Rfl: 1    tamsulosin (FLOMAX) 0.4 MG capsule 24 hr capsule, , Disp: , Rfl:     valsartan (DIOVAN) 160 MG tablet, Take 1 tablet by mouth Daily., Disp: 90 tablet, Rfl: 1    Allergies:   Allergies   Allergen Reactions    Invokana [Canagliflozin] Unknown - Low Severity    Other Unknown - High Severity    Victoza [Liraglutide] Itching and Rash       Objective     Physical Exam:  Vital Signs:   Vitals:    06/07/23 1551   BP: 138/52   BP Location: Left arm   Patient Position: Sitting   Cuff Size: Adult   Pulse: 70   Resp: 21   Temp: 97.3 °F (36.3 °C)   TempSrc: Infrared   SpO2: 94%   Weight: 109 kg (241 lb 1.6 oz)   Height: 175.3 cm (69.02\")     Body mass index is 35.59 kg/m².     Physical Exam  Vitals and nursing note reviewed.   Constitutional:       Appearance: Normal appearance.   HENT:      Head: Normocephalic and atraumatic.   Cardiovascular:      Rate and Rhythm: Normal rate and regular rhythm.   Pulmonary:      Effort: Pulmonary effort is normal.      Breath sounds: Normal breath sounds.   Abdominal:      Palpations: Abdomen is soft.      Tenderness: There is no abdominal tenderness.   Musculoskeletal:         General: Swelling present. Normal range of motion.      Cervical back: Normal range of motion and neck supple.      Right lower leg: Edema present.      Left lower leg: Edema present.      Comments: Mild edema of fingers and hands --she can slip off her wedding ring  Mild edema of legs as well.   Skin:     General: Skin is warm and dry.   Neurological:      General: No focal deficit present.      Mental Status: She is alert.   Psychiatric:         Mood and Affect: Mood normal.         Behavior: Behavior normal.       Procedures    PHQ-9 Total Score:   "     Assessment / Plan      Assessment/Plan:   Diagnoses and all orders for this visit:    1. Nephrolithiasis (Primary)    2. Acute kidney injury    3. Coronary artery disease involving native coronary artery of native heart without angina pectoris    4. Type 2 diabetes mellitus with hyperglycemia, with long-term current use of insulin    5. Hypothyroidism, unspecified type    6. Primary hypertension         Medications updated.  Encouraged good p.o. intake --plenty of water maybe hold her metformin for another day or 2 and monitor sugars we will follow-up on the 21st for regular checkup and blood work prior to that.  If she has any fever go to the ER /call Dr. Zheng she will follow-up with him on Friday to get the stent removed.     Continue other meds as directed    Moderate level decision making  Follow Up:   Return if symptoms worsen or fail to improve.        Johny Brantley MD  [facility]   Portions of note created with Dragon voice recognition technology

## 2023-06-07 NOTE — OUTREACH NOTE
Call Center TCM Note      Flowsheet Row Responses   [facility] patient discharged from? Non-BH   Does the patient have one of the following disease processes/diagnoses(primary or secondary)? Other   TCM attempt successful? Yes  [No verbal release on file]   Call start time 1430   Call end time 1434   Discharge diagnosis ACUTE KIDNEY INJURY   Meds reviewed with patient/caregiver? Yes   Is the patient having any side effects they believe may be caused by any medication additions or changes? No   Does the patient have all medications ordered at discharge? Yes   Is the patient taking all medications as directed (includes completed medication regime)? Yes   Comments There are no available appts within TCM guideliens, patient wishes to follow up with Dr. Brantley only   Does the patient have an appointment with their PCP within 7 days of discharge? No appointments available   Nursing Interventions Routed TCM call to PCP office   Has home health visited the patient within 72 hours of discharge? N/A   Psychosocial issues? No   Did the patient receive a copy of their discharge instructions? --  [Non BH]   What is the patient's perception of their health status since discharge? Improving   Is the patient/caregiver able to teach back signs and symptoms related to disease process for when to call PCP? Yes   Is the patient/caregiver able to teach back signs and symptoms related to disease process for when to call 911? Yes   Is the patient/caregiver able to teach back the hierarchy of who to call/visit for symptoms/problems? PCP, Specialist, Home health nurse, Urgent Care, ED, 911 Yes   If the patient is a current smoker, are they able to teach back resources for cessation? Not a smoker   TCM call completed? Yes   Call end time 1434   Would this patient benefit from a Referral to Amb Social Work? No   Is the patient interested in additional calls from an ambulatory ?  NOTE:  applies to high risk patients requiring  additional follow-up. No            Marie Walls RN    6/7/2023, 14:34 EDT

## 2023-06-15 ENCOUNTER — LAB (OUTPATIENT)
Dept: FAMILY MEDICINE CLINIC | Facility: CLINIC | Age: 68
End: 2023-06-15
Payer: MEDICARE

## 2023-06-15 DIAGNOSIS — E11.65 TYPE 2 DIABETES MELLITUS WITH HYPERGLYCEMIA, WITH LONG-TERM CURRENT USE OF INSULIN: ICD-10-CM

## 2023-06-15 DIAGNOSIS — Z79.899 HIGH RISK MEDICATION USE: ICD-10-CM

## 2023-06-15 DIAGNOSIS — Z79.4 TYPE 2 DIABETES MELLITUS WITH HYPERGLYCEMIA, WITH LONG-TERM CURRENT USE OF INSULIN: ICD-10-CM

## 2023-06-15 DIAGNOSIS — E78.2 MIXED HYPERLIPIDEMIA: ICD-10-CM

## 2023-06-15 PROCEDURE — 36415 COLL VENOUS BLD VENIPUNCTURE: CPT | Performed by: FAMILY MEDICINE

## 2023-06-16 LAB
ALBUMIN SERPL-MCNC: 4.2 G/DL (ref 3.8–4.8)
ALBUMIN/GLOB SERPL: 1.6 {RATIO} (ref 1.2–2.2)
ALP SERPL-CCNC: 83 IU/L (ref 44–121)
ALT SERPL-CCNC: 40 IU/L (ref 0–32)
AST SERPL-CCNC: 35 IU/L (ref 0–40)
BASOPHILS # BLD AUTO: 0.1 X10E3/UL (ref 0–0.2)
BASOPHILS NFR BLD AUTO: 1 %
BILIRUB SERPL-MCNC: 0.4 MG/DL (ref 0–1.2)
BUN SERPL-MCNC: 17 MG/DL (ref 8–27)
BUN/CREAT SERPL: 15 (ref 12–28)
CALCIUM SERPL-MCNC: 9.9 MG/DL (ref 8.7–10.3)
CHLORIDE SERPL-SCNC: 104 MMOL/L (ref 96–106)
CHOLEST SERPL-MCNC: 134 MG/DL (ref 100–199)
CK SERPL-CCNC: 77 U/L (ref 32–182)
CO2 SERPL-SCNC: 20 MMOL/L (ref 20–29)
CREAT SERPL-MCNC: 1.13 MG/DL (ref 0.57–1)
EGFRCR SERPLBLD CKD-EPI 2021: 53 ML/MIN/1.73
EOSINOPHIL # BLD AUTO: 0.3 X10E3/UL (ref 0–0.4)
EOSINOPHIL NFR BLD AUTO: 4 %
ERYTHROCYTE [DISTWIDTH] IN BLOOD BY AUTOMATED COUNT: 14.7 % (ref 11.7–15.4)
GLOBULIN SER CALC-MCNC: 2.6 G/DL (ref 1.5–4.5)
GLUCOSE SERPL-MCNC: 118 MG/DL (ref 70–99)
HCT VFR BLD AUTO: 37.3 % (ref 34–46.6)
HDLC SERPL-MCNC: 34 MG/DL
HGB BLD-MCNC: 12.3 G/DL (ref 11.1–15.9)
IMM GRANULOCYTES # BLD AUTO: 0.1 X10E3/UL (ref 0–0.1)
IMM GRANULOCYTES NFR BLD AUTO: 1 %
LDLC SERPL CALC-MCNC: 67 MG/DL (ref 0–99)
LYMPHOCYTES # BLD AUTO: 2.2 X10E3/UL (ref 0.7–3.1)
LYMPHOCYTES NFR BLD AUTO: 27 %
MCH RBC QN AUTO: 28.5 PG (ref 26.6–33)
MCHC RBC AUTO-ENTMCNC: 33 G/DL (ref 31.5–35.7)
MCV RBC AUTO: 86 FL (ref 79–97)
MONOCYTES # BLD AUTO: 0.8 X10E3/UL (ref 0.1–0.9)
MONOCYTES NFR BLD AUTO: 10 %
NEUTROPHILS # BLD AUTO: 4.7 X10E3/UL (ref 1.4–7)
NEUTROPHILS NFR BLD AUTO: 57 %
PLATELET # BLD AUTO: 451 X10E3/UL (ref 150–450)
POTASSIUM SERPL-SCNC: 4.5 MMOL/L (ref 3.5–5.2)
PROT SERPL-MCNC: 6.8 G/DL (ref 6–8.5)
RBC # BLD AUTO: 4.32 X10E6/UL (ref 3.77–5.28)
SODIUM SERPL-SCNC: 141 MMOL/L (ref 134–144)
TRIGL SERPL-MCNC: 201 MG/DL (ref 0–149)
VLDLC SERPL CALC-MCNC: 33 MG/DL (ref 5–40)
WBC # BLD AUTO: 8.1 X10E3/UL (ref 3.4–10.8)

## 2023-06-21 PROBLEM — N20.1 URETERIC STONE: Status: ACTIVE | Noted: 2023-06-09

## 2023-08-18 DIAGNOSIS — Z79.4 TYPE 2 DIABETES MELLITUS WITH HYPERGLYCEMIA, WITH LONG-TERM CURRENT USE OF INSULIN: ICD-10-CM

## 2023-08-18 DIAGNOSIS — E11.65 TYPE 2 DIABETES MELLITUS WITH HYPERGLYCEMIA, WITH LONG-TERM CURRENT USE OF INSULIN: ICD-10-CM

## 2023-08-18 RX ORDER — GLIMEPIRIDE 4 MG/1
TABLET ORAL
Qty: 180 TABLET | Refills: 0 | Status: SHIPPED | OUTPATIENT
Start: 2023-08-18

## 2023-08-18 NOTE — TELEPHONE ENCOUNTER
Rx Refill Note    Requested Prescriptions     Pending Prescriptions Disp Refills    metFORMIN (GLUCOPHAGE) 850 MG tablet [Pharmacy Med Name: metFORMIN HCL 850MG TABLET] 270 tablet 0     Sig: TAKE ONE TABLET BY MOUTH THREE TIMES A DAY WITH A MEAL    glimepiride (AMARYL) 4 MG tablet [Pharmacy Med Name: GLIMEPIRIDE 4 MG TABLET] 180 tablet 0     Sig: TAKE ONE TABLET BY MOUTH TWICE A DAY        Last office visit with prescribing clinician: 6/21/2023      Next office visit with prescribing clinician: 9/20/2023   Last labs:   Last refill: 02/28/2023   Pharmacy (be sure to add in Epic). correct

## 2023-09-14 ENCOUNTER — LAB (OUTPATIENT)
Dept: FAMILY MEDICINE CLINIC | Facility: CLINIC | Age: 68
End: 2023-09-14
Payer: MEDICARE

## 2023-09-14 DIAGNOSIS — E11.65 TYPE 2 DIABETES MELLITUS WITH HYPERGLYCEMIA, WITH LONG-TERM CURRENT USE OF INSULIN: ICD-10-CM

## 2023-09-14 DIAGNOSIS — Z79.4 TYPE 2 DIABETES MELLITUS WITH HYPERGLYCEMIA, WITH LONG-TERM CURRENT USE OF INSULIN: ICD-10-CM

## 2023-09-14 PROCEDURE — 36415 COLL VENOUS BLD VENIPUNCTURE: CPT | Performed by: FAMILY MEDICINE

## 2023-09-15 LAB
ALBUMIN SERPL-MCNC: 4.4 G/DL (ref 3.9–4.9)
ALBUMIN/GLOB SERPL: 1.8 {RATIO} (ref 1.2–2.2)
ALP SERPL-CCNC: 83 IU/L (ref 44–121)
ALT SERPL-CCNC: 28 IU/L (ref 0–32)
AST SERPL-CCNC: 23 IU/L (ref 0–40)
BILIRUB SERPL-MCNC: 0.3 MG/DL (ref 0–1.2)
BUN SERPL-MCNC: 22 MG/DL (ref 8–27)
BUN/CREAT SERPL: 23 (ref 12–28)
CALCIUM SERPL-MCNC: 9.4 MG/DL (ref 8.7–10.3)
CHLORIDE SERPL-SCNC: 104 MMOL/L (ref 96–106)
CO2 SERPL-SCNC: 20 MMOL/L (ref 20–29)
CREAT SERPL-MCNC: 0.95 MG/DL (ref 0.57–1)
EGFRCR SERPLBLD CKD-EPI 2021: 65 ML/MIN/1.73
GLOBULIN SER CALC-MCNC: 2.5 G/DL (ref 1.5–4.5)
GLUCOSE SERPL-MCNC: 134 MG/DL (ref 70–99)
HBA1C MFR BLD: 8.1 % (ref 4.8–5.6)
POTASSIUM SERPL-SCNC: 4.8 MMOL/L (ref 3.5–5.2)
PROT SERPL-MCNC: 6.9 G/DL (ref 6–8.5)
SODIUM SERPL-SCNC: 139 MMOL/L (ref 134–144)

## 2023-09-20 ENCOUNTER — OFFICE VISIT (OUTPATIENT)
Dept: FAMILY MEDICINE CLINIC | Facility: CLINIC | Age: 68
End: 2023-09-20
Payer: MEDICARE

## 2023-09-20 VITALS
BODY MASS INDEX: 35.4 KG/M2 | HEART RATE: 82 BPM | OXYGEN SATURATION: 91 % | DIASTOLIC BLOOD PRESSURE: 70 MMHG | HEIGHT: 69 IN | SYSTOLIC BLOOD PRESSURE: 150 MMHG | WEIGHT: 239 LBS

## 2023-09-20 DIAGNOSIS — E03.9 HYPOTHYROIDISM, UNSPECIFIED TYPE: Primary | ICD-10-CM

## 2023-09-20 DIAGNOSIS — E11.65 TYPE 2 DIABETES MELLITUS WITH HYPERGLYCEMIA, WITH LONG-TERM CURRENT USE OF INSULIN: ICD-10-CM

## 2023-09-20 DIAGNOSIS — Z79.4 TYPE 2 DIABETES MELLITUS WITH HYPERGLYCEMIA, WITH LONG-TERM CURRENT USE OF INSULIN: ICD-10-CM

## 2023-09-20 DIAGNOSIS — I10 PRIMARY HYPERTENSION: ICD-10-CM

## 2023-09-20 PROCEDURE — 99214 OFFICE O/P EST MOD 30 MIN: CPT | Performed by: FAMILY MEDICINE

## 2023-09-20 PROCEDURE — 1159F MED LIST DOCD IN RCRD: CPT | Performed by: FAMILY MEDICINE

## 2023-09-20 PROCEDURE — 3078F DIAST BP <80 MM HG: CPT | Performed by: FAMILY MEDICINE

## 2023-09-20 PROCEDURE — 3077F SYST BP >= 140 MM HG: CPT | Performed by: FAMILY MEDICINE

## 2023-09-20 PROCEDURE — 1160F RVW MEDS BY RX/DR IN RCRD: CPT | Performed by: FAMILY MEDICINE

## 2023-09-20 PROCEDURE — 3052F HG A1C>EQUAL 8.0%<EQUAL 9.0%: CPT | Performed by: FAMILY MEDICINE

## 2023-09-20 RX ORDER — AMLODIPINE BESYLATE 10 MG/1
10 TABLET ORAL DAILY
Qty: 90 TABLET | Refills: 1 | Status: SHIPPED | OUTPATIENT
Start: 2023-09-20

## 2023-09-20 RX ORDER — COLESEVELAM HYDROCHLORIDE 3.75 G/1
3.75 POWDER, FOR SUSPENSION ORAL DAILY
Qty: 90 EACH | Refills: 1 | Status: SHIPPED | OUTPATIENT
Start: 2023-09-20

## 2023-09-20 RX ORDER — PIOGLITAZONEHYDROCHLORIDE 30 MG/1
30 TABLET ORAL DAILY
Qty: 90 TABLET | Refills: 1 | Status: SHIPPED | OUTPATIENT
Start: 2023-09-20

## 2023-09-20 RX ORDER — INSULIN GLARGINE 100 [IU]/ML
INJECTION, SOLUTION SUBCUTANEOUS
Qty: 75 ML | Refills: 1 | Status: SHIPPED | OUTPATIENT
Start: 2023-09-20

## 2023-09-20 NOTE — PROGRESS NOTES
Follow Up Office Visit      Patient Name: Elisabet Harris  : 1955   MRN: [id number]     Chief Complaint:    Chief Complaint   Patient presents with    Follow-up       History of Present Illness: Elisabet Harris is a 68 y.o. female who is here today to   up on diabetes and her chronic medical problems.  Says she is having trouble remembering to take her WelChol and feels like her sugar numbers will be better if she can get that dose then at lunchtime.  Otherwise she is doing pretty well still dealing with her right knee and sees Dr. Ernst, orthopedics regularly and gets gel injections a couple times a year.  But she wonders if she should get a wheelchair as it is difficult to get around in the stores etc.    Review of Systems   Constitutional: Negative for fatigue and fever.   Respiratory: Negative for cough and shortness of breath.    Cardiovascular: Negative for chest pain and palpitations.   Skin: Negative for rash or itching      Subjective      Review of Systems:   Review of Systems    Past Medical History:   Past Medical History:   Diagnosis Date    Atrial fibrillation     post op    Coronary artery disease     Diabetes mellitus     Dyslipidemia     Hypertension     Hypothyroidism        Past Surgical History:   Past Surgical History:   Procedure Laterality Date    CORONARY ARTERY BYPASS GRAFT  2016    CORONARY STENT PLACEMENT          HYSTERECTOMY         Family History:   Family History   Problem Relation Age of Onset    Mitral valve prolapse Mother     Heart failure Mother     Heart attack Father     Prostate cancer Father     Glaucoma Sister     Hyperlipidemia Brother        Social History:   Social History     Socioeconomic History    Marital status:     Number of children: 1   Tobacco Use    Smoking status: Never    Smokeless tobacco: Never   Vaping Use    Vaping Use: Never used   Substance and Sexual Activity    Alcohol use: No    Drug use: No    Sexual activity:  "Defer       Medications:     Current Outpatient Medications:     amLODIPine (NORVASC) 10 MG tablet, Take 1 tablet by mouth Daily., Disp: 90 tablet, Rfl: 1    colesevelam (Welchol) 3.75 g pack pack, Take 1 packet by mouth Daily., Disp: 90 each, Rfl: 1    Insulin Glargine (BASAGLAR KWIKPEN) 100 UNIT/ML injection pen, 50-95 units SQ  Qd as dir, Disp: 75 mL, Rfl: 1    pioglitazone (ACTOS) 30 MG tablet, Take 1 tablet by mouth Daily., Disp: 90 tablet, Rfl: 1    SITagliptin (Januvia) 100 MG tablet, Take 1 tablet by mouth Daily., Disp: 90 tablet, Rfl: 1    aspirin 81 MG EC tablet, Take 1 tablet by mouth Daily., Disp: , Rfl:     atorvastatin (LIPITOR) 40 MG tablet, Take 1 tablet by mouth Daily., Disp: 90 tablet, Rfl: 1    bisoprolol (ZEBeta) 5 MG tablet, Take 1 tablet by mouth Daily., Disp: 90 tablet, Rfl: 1    glimepiride (AMARYL) 4 MG tablet, TAKE ONE TABLET BY MOUTH TWICE A DAY, Disp: 180 tablet, Rfl: 0    [employer] Pen Needles 31G X 6 MM misc, USE TO INJECT INSULIN AS DIRECTED DAILY, Disp: 100 each, Rfl: 3    levothyroxine (SYNTHROID, LEVOTHROID) 75 MCG tablet, TAKE ONE TABLET BY MOUTH DAILY, Disp: 90 tablet, Rfl: 1    metFORMIN (GLUCOPHAGE) 850 MG tablet, TAKE ONE TABLET BY MOUTH THREE TIMES A DAY WITH A MEAL, Disp: 270 tablet, Rfl: 0    valsartan (DIOVAN) 160 MG tablet, Take 1 tablet by mouth Daily., Disp: 90 tablet, Rfl: 1    Allergies:   Allergies   Allergen Reactions    Invokana [Canagliflozin] Unknown - Low Severity    Other Unknown - High Severity    Victoza [Liraglutide] Itching and Rash       Objective     Physical Exam:  Vital Signs:   Vitals:    09/20/23 0841 09/20/23 0909   BP: 142/80 150/70   BP Location: Left arm    Patient Position: Sitting    Cuff Size: Large Adult Large Adult   Pulse: 82    SpO2: 91%    Weight: 108 kg (239 lb)    Height: 175.3 cm (69\")      Body mass index is 35.29 kg/m².     Physical Exam  Vitals and nursing note reviewed.   Constitutional:       Appearance: Normal appearance.      " Comments: Alert, oriented pleasant white female no acute distress using a cane walks slowly due to her right knee   HENT:      Head: Normocephalic and atraumatic.   Cardiovascular:      Rate and Rhythm: Normal rate and regular rhythm.   Pulmonary:      Effort: Pulmonary effort is normal.      Breath sounds: Normal breath sounds.   Musculoskeletal:         General: Normal range of motion.      Cervical back: Normal range of motion and neck supple.      Right lower leg: No edema.      Left lower leg: No edema.   Skin:     General: Skin is warm and dry.   Neurological:      General: No focal deficit present.      Mental Status: She is alert.   Psychiatric:         Mood and Affect: Mood normal.         Behavior: Behavior normal.       Procedures    PHQ-9 Total Score:       Assessment / Plan      Assessment/Plan:   Diagnoses and all orders for this visit:    1. Hypothyroidism, unspecified type (Primary)  -     TSH; Future    2. Type 2 diabetes mellitus with hyperglycemia, with long-term current use of insulin  -     Discontinue: SITagliptin (Januvia) 100 MG tablet; Take 1 tablet by mouth Daily.  Dispense: 90 tablet; Refill: 1  -     colesevelam (Welchol) 3.75 g pack pack; Take 1 packet by mouth Daily.  Dispense: 90 each; Refill: 1  -     pioglitazone (ACTOS) 30 MG tablet; Take 1 tablet by mouth Daily.  Dispense: 90 tablet; Refill: 1  -     Insulin Glargine (BASAGLAR KWIKPEN) 100 UNIT/ML injection pen; 50-95 units SQ  Qd as dir  Dispense: 75 mL; Refill: 1  -     SITagliptin (Januvia) 100 MG tablet; Take 1 tablet by mouth Daily.  Dispense: 90 tablet; Refill: 1  -     Comprehensive Metabolic Panel; Future  -     Hemoglobin A1c; Future    3. Primary hypertension  -     amLODIPine (NORVASC) 10 MG tablet; Take 1 tablet by mouth Daily.  Dispense: 90 tablet; Refill: 1         Regarding diabetes continue to work on cutting carbs avoiding concentrated sweets and take her WelChol as directed continue other meds as directed she is  doing well on Basaglar 50 sugar this morning was 85.    Blood pressure was little up today but has been good at home she thinks is just because she has not taken her medicines we will follow up in 3 months    I encouraged her to try to stay moving if she wanted to get a portable wheelchair for big trip she could but I do not really want her using a wheelchair at home I would rather her exercise and stay active is much as possible and should follow-up with orthopedics to get her knee fixed.    She will get flu shot at the pharmacy in October she has any problems sooner should let us know otherwise we will see her in December with repeat blood work         Follow Up:   Return in about 3 months (around 12/20/2023) for Recheck, Labs prior next visit.        Johny Brantley MD  [facility]   Portions of note created with Dragon voice recognition technology

## 2023-09-25 RX ORDER — PEN NEEDLE, DIABETIC 31 GX5/16"
NEEDLE, DISPOSABLE MISCELLANEOUS
Qty: 100 EACH | Refills: 2 | Status: SHIPPED | OUTPATIENT
Start: 2023-09-25

## 2023-09-25 NOTE — TELEPHONE ENCOUNTER
"Rx Refill Note    Requested Prescriptions     Pending Prescriptions Disp Refills    Droplet Pen [place] 31G X 8 MM misc [Pharmacy Med Name: DROPLET PEN NEEDLE 31GX5/16\"] 100 each 2     Sig: USE TO INJECT INSULIN AS DIRECTED DAILY        Last office visit with prescribing clinician: 9/20/2023      Next office visit with prescribing clinician: 12/22/2023   Last labs:   Last refill: needs   Pharmacy (be sure to add in Epic). correct              "

## 2023-11-19 DIAGNOSIS — E11.65 TYPE 2 DIABETES MELLITUS WITH HYPERGLYCEMIA, WITH LONG-TERM CURRENT USE OF INSULIN: ICD-10-CM

## 2023-11-19 DIAGNOSIS — E03.9 HYPOTHYROIDISM, UNSPECIFIED TYPE: ICD-10-CM

## 2023-11-19 DIAGNOSIS — Z79.4 TYPE 2 DIABETES MELLITUS WITH HYPERGLYCEMIA, WITH LONG-TERM CURRENT USE OF INSULIN: ICD-10-CM

## 2023-11-20 RX ORDER — LEVOTHYROXINE SODIUM 0.07 MG/1
TABLET ORAL
Qty: 90 TABLET | Refills: 1 | Status: SHIPPED | OUTPATIENT
Start: 2023-11-20

## 2023-11-20 RX ORDER — GLIMEPIRIDE 4 MG/1
4 TABLET ORAL 2 TIMES DAILY
Qty: 180 TABLET | Refills: 0 | Status: SHIPPED | OUTPATIENT
Start: 2023-11-20

## 2023-12-15 ENCOUNTER — LAB (OUTPATIENT)
Dept: FAMILY MEDICINE CLINIC | Facility: CLINIC | Age: 68
End: 2023-12-15
Payer: MEDICARE

## 2023-12-15 DIAGNOSIS — E11.65 TYPE 2 DIABETES MELLITUS WITH HYPERGLYCEMIA, WITH LONG-TERM CURRENT USE OF INSULIN: ICD-10-CM

## 2023-12-15 DIAGNOSIS — E03.9 HYPOTHYROIDISM, UNSPECIFIED TYPE: ICD-10-CM

## 2023-12-15 DIAGNOSIS — Z79.4 TYPE 2 DIABETES MELLITUS WITH HYPERGLYCEMIA, WITH LONG-TERM CURRENT USE OF INSULIN: ICD-10-CM

## 2023-12-15 PROCEDURE — 36415 COLL VENOUS BLD VENIPUNCTURE: CPT | Performed by: FAMILY MEDICINE

## 2023-12-16 LAB
ALBUMIN SERPL-MCNC: 4.4 G/DL (ref 3.9–4.9)
ALBUMIN/GLOB SERPL: 1.8 {RATIO} (ref 1.2–2.2)
ALP SERPL-CCNC: 82 IU/L (ref 44–121)
ALT SERPL-CCNC: 25 IU/L (ref 0–32)
AST SERPL-CCNC: 18 IU/L (ref 0–40)
BILIRUB SERPL-MCNC: 0.4 MG/DL (ref 0–1.2)
BUN SERPL-MCNC: 21 MG/DL (ref 8–27)
BUN/CREAT SERPL: 20 (ref 12–28)
CALCIUM SERPL-MCNC: 9.6 MG/DL (ref 8.7–10.3)
CHLORIDE SERPL-SCNC: 104 MMOL/L (ref 96–106)
CO2 SERPL-SCNC: 20 MMOL/L (ref 20–29)
CREAT SERPL-MCNC: 1.03 MG/DL (ref 0.57–1)
EGFRCR SERPLBLD CKD-EPI 2021: 59 ML/MIN/1.73
GLOBULIN SER CALC-MCNC: 2.5 G/DL (ref 1.5–4.5)
GLUCOSE SERPL-MCNC: 67 MG/DL (ref 70–99)
HBA1C MFR BLD: 7.9 % (ref 4.8–5.6)
POTASSIUM SERPL-SCNC: 4.5 MMOL/L (ref 3.5–5.2)
PROT SERPL-MCNC: 6.9 G/DL (ref 6–8.5)
SODIUM SERPL-SCNC: 142 MMOL/L (ref 134–144)
TSH SERPL DL<=0.005 MIU/L-ACNC: 1.72 UIU/ML (ref 0.45–4.5)

## 2024-01-02 ENCOUNTER — OFFICE VISIT (OUTPATIENT)
Dept: FAMILY MEDICINE CLINIC | Facility: CLINIC | Age: 69
End: 2024-01-02
Payer: MEDICARE

## 2024-01-02 VITALS
HEIGHT: 69 IN | OXYGEN SATURATION: 93 % | HEART RATE: 79 BPM | WEIGHT: 234 LBS | BODY MASS INDEX: 34.66 KG/M2 | SYSTOLIC BLOOD PRESSURE: 130 MMHG | DIASTOLIC BLOOD PRESSURE: 70 MMHG

## 2024-01-02 DIAGNOSIS — Z79.4 TYPE 2 DIABETES MELLITUS WITH HYPERGLYCEMIA, WITH LONG-TERM CURRENT USE OF INSULIN: ICD-10-CM

## 2024-01-02 DIAGNOSIS — Z00.00 ENCOUNTER FOR SUBSEQUENT ANNUAL WELLNESS VISIT (AWV) IN MEDICARE PATIENT: Primary | ICD-10-CM

## 2024-01-02 DIAGNOSIS — J40 BRONCHITIS: ICD-10-CM

## 2024-01-02 DIAGNOSIS — E11.65 TYPE 2 DIABETES MELLITUS WITH HYPERGLYCEMIA, WITH LONG-TERM CURRENT USE OF INSULIN: ICD-10-CM

## 2024-01-02 DIAGNOSIS — I25.810 CORONARY ARTERY DISEASE INVOLVING CORONARY BYPASS GRAFT OF NATIVE HEART WITHOUT ANGINA PECTORIS: ICD-10-CM

## 2024-01-02 DIAGNOSIS — I10 BENIGN HYPERTENSION: ICD-10-CM

## 2024-01-02 DIAGNOSIS — E78.2 MIXED HYPERLIPIDEMIA: ICD-10-CM

## 2024-01-02 LAB
EXPIRATION DATE: NORMAL
Lab: NORMAL
POC MICROALBUMIN URINE: 100

## 2024-01-02 RX ORDER — GLIMEPIRIDE 4 MG/1
4 TABLET ORAL 2 TIMES DAILY
Qty: 180 TABLET | Refills: 0 | Status: SHIPPED | OUTPATIENT
Start: 2024-01-02

## 2024-01-02 RX ORDER — COLESEVELAM 180 1/1
1875 TABLET ORAL 2 TIMES DAILY WITH MEALS
Qty: 540 TABLET | Refills: 1 | Status: SHIPPED | OUTPATIENT
Start: 2024-01-02

## 2024-01-02 RX ORDER — BISOPROLOL FUMARATE 5 MG/1
5 TABLET, FILM COATED ORAL DAILY
Qty: 90 TABLET | Refills: 1 | Status: SHIPPED | OUTPATIENT
Start: 2024-01-02

## 2024-01-02 RX ORDER — ATORVASTATIN CALCIUM 40 MG/1
40 TABLET, FILM COATED ORAL DAILY
Qty: 90 TABLET | Refills: 1 | Status: SHIPPED | OUTPATIENT
Start: 2024-01-02

## 2024-01-02 RX ORDER — VALSARTAN 160 MG/1
160 TABLET ORAL DAILY
Qty: 90 TABLET | Refills: 1 | Status: SHIPPED | OUTPATIENT
Start: 2024-01-02

## 2024-01-02 RX ORDER — AZITHROMYCIN 250 MG/1
TABLET, FILM COATED ORAL
Qty: 6 TABLET | Refills: 0 | Status: SHIPPED | OUTPATIENT
Start: 2024-01-02

## 2024-01-02 NOTE — PROGRESS NOTES
The ABCs of the Annual Wellness Visit  Subsequent Medicare Wellness Visit    Subjective    Elisabet Harris is a 68 y.o. female who presents for a Subsequent Medicare Wellness Visit.    The following portions of the patient's history were reviewed and   updated as appropriate: allergies, current medications, past family history, past medical history, past social history, past surgical history, and problem list.    Compared to one year ago, the patient feels her physical   health is the same.    Compared to one year ago, the patient feels her mental   health is the same.    Recent Hospitalizations:  She was admitted within the past 365 days at [facility]. Kidney  stone      Current Medical Providers:  Patient Care Team:  Johny Brantley MD as PCP - General  Johny Brantley MD as PCP - Family Medicine    Outpatient Medications Prior to Visit   Medication Sig Dispense Refill    Droplet Pen Needles 31G X 8 MM misc USE TO INJECT INSULIN AS DIRECTED DAILY 100 each 2    levothyroxine (SYNTHROID, LEVOTHROID) 75 MCG tablet TAKE ONE TABLET BY MOUTH DAILY 90 tablet 1    amLODIPine (NORVASC) 10 MG tablet Take 1 tablet by mouth Daily. 90 tablet 1    aspirin 81 MG EC tablet Take 1 tablet by mouth Daily.      Insulin Glargine (BASAGLAR KWIKPEN) 100 UNIT/ML injection pen 50-95 units SQ  Qd as dir 75 mL 1    [employer] Pen Needles 31G X 6 MM misc USE TO INJECT INSULIN AS DIRECTED DAILY 100 each 3    pioglitazone (ACTOS) 30 MG tablet Take 1 tablet by mouth Daily. 90 tablet 1    SITagliptin (Januvia) 100 MG tablet Take 1 tablet by mouth Daily. 90 tablet 1    atorvastatin (LIPITOR) 40 MG tablet Take 1 tablet by mouth Daily. 90 tablet 1    bisoprolol (ZEBeta) 5 MG tablet Take 1 tablet by mouth Daily. 90 tablet 1    colesevelam (Welchol) 3.75 g pack pack Take 1 packet by mouth Daily. 90 each 1    glimepiride (AMARYL) 4 MG tablet TAKE 1 TABLET BY MOUTH TWICE A  tablet 0    metFORMIN (GLUCOPHAGE) 850 MG tablet TAKE ONE TABLET BY MOUTH THREE  "TIMES A DAY WITH A MEAL 270 tablet 0    valsartan (DIOVAN) 160 MG tablet Take 1 tablet by mouth Daily. 90 tablet 1     No facility-administered medications prior to visit.       No opioid medication identified on active medication list. I have reviewed chart for other potential  high risk medication/s and harmful drug interactions in the elderly.        Aspirin is on active medication list. Aspirin use is indicated based on review of current medical condition/s. Pros and cons of this therapy have been discussed today. Benefits of this medication outweigh potential harm.  Patient has been encouraged to continue taking this medication.  .      Patient Active Problem List   Diagnosis    Coronary artery disease involving coronary bypass graft of native heart without angina pectoris    Arthritis of knee    Benign hypertension    Hearing loss    Hypothyroidism    Ischemic cardiomyopathy    Mixed hyperlipidemia    Obesity (BMI 30.0-34.9)    Sleep apnea    Type 2 diabetes mellitus    Ureteric stone     Advance Care Planning   Advance Care Planning     Advance Directive is not on file.  ACP discussion was held with the patient during this visit. Patient does not have an advance directive, information provided. Pt  would be her healthcare surrogate; James Harris [phone]     Objective    Vitals:    01/02/24 1128   BP: 130/70   BP Location: Left arm   Patient Position: Sitting   Cuff Size: Large Adult   Pulse: 79   SpO2: 93%   Weight: 106 kg (234 lb)   Height: 175.3 cm (69\")     Estimated body mass index is 34.56 kg/m² as calculated from the following:    Height as of this encounter: 175.3 cm (69\").    Weight as of this encounter: 106 kg (234 lb).           Does the patient have evidence of cognitive impairment? No    Lab Results   Component Value Date    HGBA1C 7.9 (H) 12/15/2023        HEALTH RISK ASSESSMENT    Smoking Status:  Social History     Tobacco Use   Smoking Status Never   Smokeless Tobacco Never "     Alcohol Consumption:  Social History     Substance and Sexual Activity   Alcohol Use No     Fall Risk Screen:    MICHELLEADI Fall Risk Assessment was completed, and patient is at LOW risk for falls.Assessment completed on:2024    Depression Screenin/2/2024    11:32 AM   PHQ-2/PHQ-9 Depression Screening   Little Interest or Pleasure in Doing Things 0-->not at all   Feeling Down, Depressed or Hopeless 0-->not at all   PHQ-9: Brief Depression Severity Measure Score 0       Health Habits and Functional and Cognitive Screenin/2/2024    11:32 AM   Functional & Cognitive Status   Do you have difficulty preparing food and eating? No   Do you have difficulty bathing yourself, getting dressed or grooming yourself? No   Do you have difficulty using the toilet? No   Do you have difficulty moving around from place to place? No   Do you have trouble with steps or getting out of a bed or a chair? No   Current Diet Well Balanced Diet   Dental Exam Up to date   Eye Exam Up to date   Exercise (times per week) 0 times per week   Current Exercises Include No Regular Exercise   Do you need help using the phone?  No   Are you deaf or do you have serious difficulty hearing?  No   Do you need help to go to places out of walking distance? No   Do you need help shopping? No   Do you need help preparing meals?  No   Do you need help with housework?  No   Do you need help with laundry? No   Do you need help taking your medications? No   Do you need help managing money? No   Do you ever drive or ride in a car without wearing a seat belt? No   Have you felt unusual stress, anger or loneliness in the last month? No   Who do you live with? Spouse   If you need help, do you have trouble finding someone available to you? No   Do you have difficulty concentrating, remembering or making decisions? No       Age-appropriate Screening Schedule:  Refer to the list below for future screening recommendations based on patient's age, sex  and/or medical conditions. Orders for these recommended tests are listed in the plan section. The patient has been provided with a written plan.    Health Maintenance   Topic Date Due    COVID-19 Vaccine (4 - 2023-24 season) 09/01/2023    ANNUAL WELLNESS VISIT  10/24/2023    DIABETIC FOOT EXAM  10/24/2023    BMI FOLLOWUP  02/28/2024    LIPID PANEL  06/15/2024    HEMOGLOBIN A1C  06/15/2024    DIABETIC EYE EXAM  08/10/2024    URINE MICROALBUMIN  01/02/2025    MAMMOGRAM  07/25/2025    DXA SCAN  07/25/2025    TDAP/TD VACCINES (2 - Td or Tdap) 10/07/2026    COLORECTAL CANCER SCREENING  05/08/2027    HEPATITIS C SCREENING  Completed    INFLUENZA VACCINE  Completed    Pneumococcal Vaccine 65+  Completed    ZOSTER VACCINE  Completed                  CMS Preventative Services Quick Reference  Risk Factors Identified During Encounter  Immunizations Discussed/Encouraged: COVID19  The above risks/problems have been discussed with the patient.  Pertinent information has been shared with the patient in the After Visit Summary.  An After Visit Summary and PPPS were made available to the patient.    Follow Up:   Next Medicare Wellness visit to be scheduled in 1 year.       Additional E&M Note during same encounter follows:  Patient has multiple medical problems which are significant and separately identifiable that require additional work above and beyond the Medicare Wellness Visit.      Chief Complaint  Medicare Wellness-subsequent    Subjective        HPI  Elisabet Harris is also being seen today for follow-up of her chronic medical problems and to go over blood work.  She also relates that she has had a cough since November [place] she has tried Mucinex DayQuil and Salima-[place] and really has not gotten better she done multiple home COVID tests all are negative.  And course her grandkids have had it to and brought it into her house.  Her  has little bit of cough to she has got a little bit of laryngitis she has some slight  "sore throat from the drainage and coughing and postnasal drip.  No fever no shortness of breath she had did get her flu shot.    Labs reviewed she relates that she is only on 42 units of Basaglar now since her sugars will be dropping low when she was on the Sal[place].  Sugars have been in the 50s in the mornings.  She did not tolerate the WelChol powder as she said it was nasty but she is willing to try some pills.    Review of Systems   Constitutional: Negative for fatigue and fever.   Respiratory: Negative for shortness of breath.    Cardiovascular: Negative for chest pain and palpitations.   Skin: Negative for rash or itching           Objective   Vital Signs:  /70 (BP Location: Left arm, Patient Position: Sitting, Cuff Size: Large Adult)   Pulse 79   Ht 175.3 cm (69\")   Wt 106 kg (234 lb)   SpO2 93%   BMI 34.56 kg/m²     Physical Exam  Vitals and nursing note reviewed.   Constitutional:       Appearance: Normal appearance.      Comments: Alert oriented pleasant white female no acute distress with mildly hoarse voice and frequent coughing episodes.   HENT:      Head: Normocephalic and atraumatic.      Right Ear: Tympanic membrane, ear canal and external ear normal.      Left Ear: Tympanic membrane, ear canal and external ear normal.      Ears:      Comments: She has hearing aids     Nose: Nose normal.      Mouth/Throat:      Mouth: Mucous membranes are moist.      Pharynx: Posterior oropharyngeal erythema present.   Cardiovascular:      Rate and Rhythm: Normal rate and regular rhythm.   Pulmonary:      Effort: Pulmonary effort is normal.      Breath sounds: Normal breath sounds.   Abdominal:      Palpations: Abdomen is soft.      Tenderness: There is no abdominal tenderness.   Musculoskeletal:         General: Normal range of motion.      Cervical back: Normal range of motion and neck supple.      Right lower leg: No edema.      Left lower leg: No edema.   Skin:     General: Skin is warm and dry. "   Neurological:      General: No focal deficit present.      Mental Status: She is alert.   Psychiatric:         Mood and Affect: Mood normal.         Behavior: Behavior normal.                         Assessment and Plan   Diagnoses and all orders for this visit:    1. Encounter for subsequent annual wellness visit (AWV) in Medicare patient (Primary)    2. Type 2 diabetes mellitus with hyperglycemia, with long-term current use of insulin  -     POCT microalbumin  -     colesevelam (Welchol) 625 MG tablet; Take 3 tablets by mouth 2 (Two) Times a Day With Meals.  Dispense: 540 tablet; Refill: 1  -     metFORMIN (GLUCOPHAGE) 850 MG tablet; TAKE ONE TABLET BY MOUTH THREE TIMES A DAY WITH A MEAL  Dispense: 270 tablet; Refill: 0  -     Comprehensive Metabolic Panel; Future  -     Hemoglobin A1c; Future    3. Mixed hyperlipidemia  -     colesevelam (Welchol) 625 MG tablet; Take 3 tablets by mouth 2 (Two) Times a Day With Meals.  Dispense: 540 tablet; Refill: 1  -     CK; Future  -     Comprehensive Metabolic Panel; Future  -     Lipid Panel; Future    4. Coronary artery disease involving coronary bypass graft of native heart without angina pectoris  -     atorvastatin (LIPITOR) 40 MG tablet; Take 1 tablet by mouth Daily.  Dispense: 90 tablet; Refill: 1  -     bisoprolol (ZEBeta) 5 MG tablet; Take 1 tablet by mouth Daily.  Dispense: 90 tablet; Refill: 1    5. Benign hypertension  -     valsartan (DIOVAN) 160 MG tablet; Take 1 tablet by mouth Daily.  Dispense: 90 tablet; Refill: 1    6. Bronchitis  -     azithromycin (Zithromax Z-Luther) 250 MG tablet; Take 2 tablets by mouth on day 1, then 1 tablet daily on days 2-5  Dispense: 6 tablet; Refill: 0    Other orders  -     glimepiride (AMARYL) 4 MG tablet; Take 1 tablet by mouth 2 (Two) Times a Day.  Dispense: 180 tablet; Refill: 0      Annual wellness completed    For her cough has been going on for 2 months we will place her on Zithromax as directed continue with symptomatic  treatment over-the-counter she says the Salima-[place] helps her sleep.    A1c is 7.9 slightly improved  Keep an eye on her sugars closely she dropped her insulin dose to 42 units due to low blood sugars been having lately.  Will add back in WelChol with the tablets and instructed on how to do this properly she knows not to take it with her Synthroid or any fat-soluble vitamin separate doses by 4 hours.  But generally will take 6 pills once a day with a meal or she could do 3 tablets twice a day with meals.  Will follow-up in March for repeat blood work.  She knows to taper back up to 3 units every third day on her insulin as directed.  If fasting blood sugar above 150    Continue other meds as directed    Microalbumin was 100.  She cannot tolerate SGL 2 inhibitors continue with Diovan    If not better we will get a chest x-ray her lungs were clear today.  I suspect she has been passing around different viruses with her family.       Follow Up   Return in about 2 months (around 3/18/2024) for Recheck, Labs prior next visit.  Patient was given instructions and counseling regarding her condition or for health maintenance advice. Please see specific information pulled into the AVS if appropriate.

## 2024-01-02 NOTE — PATIENT INSTRUCTIONS
Health Maintenance, Female  Adopting a healthy lifestyle and getting preventive care can go a long way to promote health and wellness. Talk with your health care provider about what schedule of regular examinations is right for you. This is a good chance for you to check in with your provider about disease prevention and staying healthy.  In between checkups, there are plenty of things you can do on your own. Experts have done a lot of research about which lifestyle changes and preventive measures are most likely to keep you healthy. Ask your health care provider for more information.  Weight and diet  Eat a healthy diet  Be sure to include plenty of vegetables, fruits, low-fat dairy products, and lean protein.  Do not eat a lot of foods high in solid fats, added sugars, or salt.  Get regular exercise. This is one of the most important things you can do for your health.  Most adults should exercise for at least 150 minutes each week. The exercise should increase your heart rate and make you sweat (moderate-intensity exercise).  Most adults should also do strengthening exercises at least twice a week. This is in addition to the moderate-intensity exercise.     Maintain a healthy weight  Body mass index (BMI) is a measurement that can be used to identify possible weight problems. It estimates body fat based on height and weight. Your health care provider can help determine your BMI and help you achieve or maintain a healthy weight.  For females 20 years of age and older:  A BMI below 18.5 is considered underweight.  A BMI of 18.5 to 24.9 is normal.  A BMI of 25 to 29.9 is considered overweight.  A BMI of 30 and above is considered obese.     Watch levels of cholesterol and blood lipids  You should start having your blood tested for lipids and cholesterol at 20 years of age, then have this test every 5 years.  You may need to have your cholesterol levels checked more often if:  Your lipid or cholesterol levels are  high.  You are older than 50 years of age.  You are at high risk for heart disease.     Cancer screening  Lung Cancer  Lung cancer screening is recommended for adults 55-80 years old who are at high risk for lung cancer because of a history of smoking.  A yearly low-dose CT scan of the lungs is recommended for people who:  Currently smoke.  Have quit within the past 15 years.  Have at least a 30-pack-year history of smoking. A pack year is smoking an average of one pack of cigarettes a day for 1 year.  Yearly screening should continue until it has been 15 years since you quit.  Yearly screening should stop if you develop a health problem that would prevent you from having lung cancer treatment.     Breast Cancer  Practice breast self-awareness. This means understanding how your breasts normally appear and feel.  It also means doing regular breast self-exams. Let your health care provider know about any changes, no matter how small.  If you are in your 20s or 30s, you should have a clinical breast exam (CBE) by a health care provider every 1-3 years as part of a regular health exam.  If you are 40 or older, have a CBE every year. Also consider having a breast X-ray (mammogram) every year.  If you have a family history of breast cancer, talk to your health care provider about genetic screening.  If you are at high risk for breast cancer, talk to your health care provider about having an MRI and a mammogram every year.  Breast cancer gene (BRCA) assessment is recommended for women who have family members with BRCA-related cancers. BRCA-related cancers include:  Breast.  Ovarian.  Tubal.  Peritoneal cancers.  Results of the assessment will determine the need for genetic counseling and BRCA1 and BRCA2 testing.     Cervical Cancer  Your health care provider may recommend that you be screened regularly for cancer of the pelvic organs (ovaries, uterus, and vagina). This screening involves a pelvic examination, including  checking for microscopic changes to the surface of your cervix (Pap test). You may be encouraged to have this screening done every 3 years, beginning at age 21.  For women ages 30-65, health care providers may recommend pelvic exams and Pap testing every 3 years, or they may recommend the Pap and pelvic exam, combined with testing for human papilloma virus (HPV), every 5 years. Some types of HPV increase your risk of cervical cancer. Testing for HPV may also be done on women of any age with unclear Pap test results.  Other health care providers may not recommend any screening for nonpregnant women who are considered low risk for pelvic cancer and who do not have symptoms. Ask your health care provider if a screening pelvic exam is right for you.  If you have had past treatment for cervical cancer or a condition that could lead to cancer, you need Pap tests and screening for cancer for at least 20 years after your treatment. If Pap tests have been discontinued, your risk factors (such as having a new sexual partner) need to be reassessed to determine if screening should resume. Some women have medical problems that increase the chance of getting cervical cancer. In these cases, your health care provider may recommend more frequent screening and Pap tests.     Colorectal Cancer  This type of cancer can be detected and often prevented.  Routine colorectal cancer screening usually begins at 50 years of age and continues through 75 years of age.  Your health care provider may recommend screening at an earlier age if you have risk factors for colon cancer.  Your health care provider may also recommend using home test kits to check for hidden blood in the stool.  A small camera at the end of a tube can be used to examine your colon directly (sigmoidoscopy or colonoscopy). This is done to check for the earliest forms of colorectal cancer.  Routine screening usually begins at age 50.  Direct examination of the colon should  be repeated every 5-10 years through 75 years of age. However, you may need to be screened more often if early forms of precancerous polyps or small growths are found.     Skin Cancer  Check your skin from head to toe regularly.  Tell your health care provider about any new moles or changes in moles, especially if there is a change in a mole's shape or color.  Also tell your health care provider if you have a mole that is larger than the size of a pencil eraser.  Always use sunscreen. Apply sunscreen liberally and repeatedly throughout the day.  Protect yourself by wearing long sleeves, pants, a wide-brimmed hat, and sunglasses whenever you are outside.     Heart disease, diabetes, and high blood pressure  High blood pressure causes heart disease and increases the risk of stroke. High blood pressure is more likely to develop in:  People who have blood pressure in the high end of the normal range (130-139/85-89 mm Hg).  People who are overweight or obese.  People who are .  If you are 18-39 years of age, have your blood pressure checked every 3-5 years. If you are 40 years of age or older, have your blood pressure checked every year. You should have your blood pressure measured twice--once when you are at a hospital or clinic, and once when you are not at a hospital or clinic. Record the average of the two measurements. To check your blood pressure when you are not at a hospital or clinic, you can use:  An automated blood pressure machine at a pharmacy.  A home blood pressure monitor.  If you are between 55 years and 79 years old, ask your health care provider if you should take aspirin to prevent strokes.  Have regular diabetes screenings. This involves taking a blood sample to check your fasting blood sugar level.  If you are at a normal weight and have a low risk for diabetes, have this test once every three years after 45 years of age.  If you are overweight and have a high risk for diabetes,  consider being tested at a younger age or more often.  Preventing infection  Hepatitis B  If you have a higher risk for hepatitis B, you should be screened for this virus. You are considered at high risk for hepatitis B if:  You were born in a country where hepatitis B is common. Ask your health care provider which countries are considered high risk.  Your parents were born in a high-risk country, and you have not been immunized against hepatitis B (hepatitis B vaccine).  You have HIV or AIDS.  You use needles to inject street drugs.  You live with someone who has hepatitis B.  You have had sex with someone who has hepatitis B.  You get hemodialysis treatment.  You take certain medicines for conditions, including cancer, organ transplantation, and autoimmune conditions.     Hepatitis C  Blood testing is recommended for:  Everyone born from 1945 through 1965.  Anyone with known risk factors for hepatitis C.     Sexually transmitted infections (STIs)  You should be screened for sexually transmitted infections (STIs) including gonorrhea and chlamydia if:  You are sexually active and are younger than 24 years of age.  You are older than 24 years of age and your health care provider tells you that you are at risk for this type of infection.  Your sexual activity has changed since you were last screened and you are at an increased risk for chlamydia or gonorrhea. Ask your health care provider if you are at risk.  If you do not have HIV, but are at risk, it may be recommended that you take a prescription medicine daily to prevent HIV infection. This is called pre-exposure prophylaxis (PrEP). You are considered at risk if:  You are sexually active and do not regularly use condoms or know the HIV status of your partner(s).  You take drugs by injection.  You are sexually active with a partner who has HIV.     Talk with your health care provider about whether you are at high risk of being infected with HIV. If you choose to  begin PrEP, you should first be tested for HIV. You should then be tested every 3 months for as long as you are taking PrEP.  Pregnancy  If you are premenopausal and you may become pregnant, ask your health care provider about preconception counseling.  If you may become pregnant, take 400 to 800 micrograms (mcg) of folic acid every day.  If you want to prevent pregnancy, talk to your health care provider about birth control (contraception).  Osteoporosis and menopause  Osteoporosis is a disease in which the bones lose minerals and strength with aging. This can result in serious bone fractures. Your risk for osteoporosis can be identified using a bone density scan.  If you are 65 years of age or older, or if you are at risk for osteoporosis and fractures, ask your health care provider if you should be screened.  Ask your health care provider whether you should take a calcium or vitamin D supplement to lower your risk for osteoporosis.  Menopause may have certain physical symptoms and risks.  Hormone replacement therapy may reduce some of these symptoms and risks.  Talk to your health care provider about whether hormone replacement therapy is right for you.  Follow these instructions at home:  Schedule regular health, dental, and eye exams.  Stay current with your immunizations.  Do not use any tobacco products including cigarettes, chewing tobacco, or electronic cigarettes.  If you are pregnant, do not drink alcohol.  If you are breastfeeding, limit how much and how often you drink alcohol.  Limit alcohol intake to no more than 1 drink per day for nonpregnant women. One drink equals 12 ounces of beer, 5 ounces of wine, or 1½ ounces of hard liquor.  Do not use street drugs.  Do not share needles.  Ask your health care provider for help if you need support or information about quitting drugs.  Tell your health care provider if you often feel depressed.  Tell your health care provider if you have ever been abused or do  not feel safe at home.  This information is not intended to replace advice given to you by your health care provider. Make sure you discuss any questions you have with your health care provider.  Document Released: 07/02/2012 Document Revised: 05/25/2017 Document Reviewed: 09/20/2016  [employer] Interactive Patient Education © 2018 [employer]

## 2024-02-29 ENCOUNTER — TELEPHONE (OUTPATIENT)
Dept: FAMILY MEDICINE CLINIC | Facility: CLINIC | Age: 69
End: 2024-02-29
Payer: COMMERCIAL

## 2024-02-29 DIAGNOSIS — R11.2 NAUSEA AND VOMITING, UNSPECIFIED VOMITING TYPE: Primary | ICD-10-CM

## 2024-02-29 RX ORDER — ONDANSETRON 4 MG/1
4 TABLET, FILM COATED ORAL EVERY 8 HOURS PRN
Qty: 20 TABLET | Refills: 0 | Status: SHIPPED | OUTPATIENT
Start: 2024-02-29

## 2024-02-29 NOTE — TELEPHONE ENCOUNTER
Her 's and here today says she developed a little nausea vomiting last night after dinner no abdominal pain no fever  And she is checking her sugars regular make sure those are not high.  Request something for nausea vomiting we will send a little Zofran for her if she gets any worse or any problems she will get a call she has my that she has my personal cell phone and is a neighbor.  Encourage clear liquids bland foods no milk products for 3 days

## 2024-03-18 RX ORDER — GLIMEPIRIDE 4 MG/1
4 TABLET ORAL 2 TIMES DAILY
Qty: 180 TABLET | Refills: 0 | Status: SHIPPED | OUTPATIENT
Start: 2024-03-18

## 2024-03-26 ENCOUNTER — OFFICE VISIT (OUTPATIENT)
Dept: FAMILY MEDICINE CLINIC | Facility: CLINIC | Age: 69
End: 2024-03-26
Payer: MEDICARE

## 2024-03-26 VITALS
HEART RATE: 70 BPM | SYSTOLIC BLOOD PRESSURE: 132 MMHG | DIASTOLIC BLOOD PRESSURE: 80 MMHG | OXYGEN SATURATION: 93 % | WEIGHT: 240 LBS | BODY MASS INDEX: 35.55 KG/M2 | HEIGHT: 69 IN

## 2024-03-26 DIAGNOSIS — Z79.4 TYPE 2 DIABETES MELLITUS WITH HYPERGLYCEMIA, WITH LONG-TERM CURRENT USE OF INSULIN: Primary | ICD-10-CM

## 2024-03-26 DIAGNOSIS — E11.65 TYPE 2 DIABETES MELLITUS WITH HYPERGLYCEMIA, WITH LONG-TERM CURRENT USE OF INSULIN: Primary | ICD-10-CM

## 2024-03-26 DIAGNOSIS — Z79.899 HIGH RISK MEDICATION USE: ICD-10-CM

## 2024-03-26 DIAGNOSIS — I10 PRIMARY HYPERTENSION: ICD-10-CM

## 2024-03-26 DIAGNOSIS — E03.9 HYPOTHYROIDISM, UNSPECIFIED TYPE: ICD-10-CM

## 2024-03-26 RX ORDER — AMLODIPINE BESYLATE 10 MG/1
10 TABLET ORAL DAILY
Qty: 90 TABLET | Refills: 3 | Status: SHIPPED | OUTPATIENT
Start: 2024-03-26

## 2024-03-26 RX ORDER — INSULIN GLARGINE 100 [IU]/ML
INJECTION, SOLUTION SUBCUTANEOUS
Qty: 75 ML | Refills: 1 | Status: SHIPPED | OUTPATIENT
Start: 2024-03-26

## 2024-03-26 RX ORDER — LEVOTHYROXINE SODIUM 0.07 MG/1
75 TABLET ORAL DAILY
Qty: 90 TABLET | Refills: 1 | Status: SHIPPED | OUTPATIENT
Start: 2024-03-26

## 2024-03-26 RX ORDER — PIOGLITAZONEHYDROCHLORIDE 30 MG/1
30 TABLET ORAL DAILY
Qty: 90 TABLET | Refills: 1 | Status: SHIPPED | OUTPATIENT
Start: 2024-03-26

## 2024-03-26 NOTE — PROGRESS NOTES
Follow Up Office Visit      Patient Name: Elisabet Harris  : 1955   MRN: [id number]     Chief Complaint:    Chief Complaint   Patient presents with    Follow-up       History of Present Illness: Elisabet Harris is a 68 y.o. female who is here today to   follow-up on her diabetes hypertension and to go over blood work.  Says she did get over her cough finally said the last round antibiotics did help.  And now her  has it but he is getting over it.  She is still dealing with right knee pain has been seeing Dr. Ernst has had steroid injections and gel injections over the past few years but is not able to get around really well or go up and down the stairs at home.  He wants her A1c below 7 before he would do a knee replacement.    She reports she can only get up to 4 on the WelChol otherwise she gets some vomiting.  Otherwise doing well has no complaints    Review of Systems   Constitutional: Negative for fatigue and fever.   Respiratory: Negative for cough and shortness of breath.    Cardiovascular: Negative for chest pain and palpitations.   Skin: Negative for rash or itching      Subjective      Review of Systems:   Review of Systems    Past Medical History:   Past Medical History:   Diagnosis Date    Atrial fibrillation     post op    Coronary artery disease     Diabetes mellitus     Dyslipidemia     Hypertension     Hypothyroidism        Past Surgical History:   Past Surgical History:   Procedure Laterality Date    CORONARY ARTERY BYPASS GRAFT  2016    CORONARY STENT PLACEMENT      2003    HYSTERECTOMY         Family History:   Family History   Problem Relation Age of Onset    Mitral valve prolapse Mother     Heart failure Mother     Heart attack Father     Prostate cancer Father     Glaucoma Sister     Hyperlipidemia Brother        Social History:   Social History     Socioeconomic History    Marital status:     Number of children: 1   Tobacco Use    Smoking status: Never     Smokeless tobacco: Never   Vaping Use    Vaping status: Never Used   Substance and Sexual Activity    Alcohol use: No    Drug use: No    Sexual activity: Defer       Medications:     Current Outpatient Medications:     amLODIPine (NORVASC) 10 MG tablet, Take 1 tablet by mouth Daily., Disp: 90 tablet, Rfl: 3    Droplet Pen Needles 31G X 8 MM misc, USE TO INJECT INSULIN AS DIRECTED DAILY, Disp: 100 each, Rfl: 2    Insulin Glargine (BASAGLAR KWIKPEN) 100 UNIT/ML injection pen, 50-95 units SQ  Qd as dir, Disp: 75 mL, Rfl: 1    levothyroxine (SYNTHROID, LEVOTHROID) 75 MCG tablet, Take 1 tablet by mouth Daily., Disp: 90 tablet, Rfl: 1    metFORMIN (GLUCOPHAGE) 850 MG tablet, TAKE ONE TABLET BY MOUTH THREE TIMES A DAY WITH A MEAL, Disp: 270 tablet, Rfl: 0    pioglitazone (ACTOS) 30 MG tablet, Take 1 tablet by mouth Daily., Disp: 90 tablet, Rfl: 1    SITagliptin (Januvia) 100 MG tablet, Take 1 tablet by mouth Daily., Disp: 90 tablet, Rfl: 1    aspirin 81 MG EC tablet, Take 1 tablet by mouth Daily., Disp: , Rfl:     atorvastatin (LIPITOR) 40 MG tablet, Take 1 tablet by mouth Daily., Disp: 90 tablet, Rfl: 1    bisoprolol (ZEBeta) 5 MG tablet, Take 1 tablet by mouth Daily., Disp: 90 tablet, Rfl: 1    colesevelam (Welchol) 625 MG tablet, Take 3 tablets by mouth 2 (Two) Times a Day With Meals., Disp: 540 tablet, Rfl: 1    glimepiride (AMARYL) 4 MG tablet, Take 1 tablet by mouth 2 (Two) Times a Day., Disp: 180 tablet, Rfl: 0    [employer] Pen Needles 31G X 6 MM misc, USE TO INJECT INSULIN AS DIRECTED DAILY, Disp: 100 each, Rfl: 3    valsartan (DIOVAN) 160 MG tablet, Take 1 tablet by mouth Daily., Disp: 90 tablet, Rfl: 1    Allergies:   Allergies   Allergen Reactions    Invokana [Canagliflozin] Unknown - Low Severity    Other Unknown - High Severity    Victoza [Liraglutide] Itching and Rash       Objective     Physical Exam:  Vital Signs:   Vitals:    03/26/24 0848   BP: 132/80   BP Location: Left arm   Patient Position: Sitting  "  Cuff Size: Large Adult   Pulse: 70   SpO2: 93%   Weight: 109 kg (240 lb)   Height: 175.3 cm (69\")     Facility age limit for growth %zaina is 20 years.  Body mass index is 35.44 kg/m².     Physical Exam  Vitals and nursing note reviewed.   Constitutional:       Appearance: Normal appearance.      Comments: Alert oriented pleasant white female no acute distress walks slowly with a cane   HENT:      Head: Normocephalic and atraumatic.   Cardiovascular:      Rate and Rhythm: Normal rate and regular rhythm.   Pulmonary:      Effort: Pulmonary effort is normal.      Breath sounds: Normal breath sounds.   Abdominal:      Palpations: Abdomen is soft.      Tenderness: There is no abdominal tenderness.   Musculoskeletal:         General: Normal range of motion.      Cervical back: Normal range of motion and neck supple.      Right lower leg: No edema.      Left lower leg: No edema.   Skin:     General: Skin is warm and dry.   Neurological:      General: No focal deficit present.      Mental Status: She is alert.   Psychiatric:         Mood and Affect: Mood normal.         Behavior: Behavior normal.         Procedures    PHQ-9 Total Score:       Assessment / Plan      Assessment/Plan:   Diagnoses and all orders for this visit:    1. Type 2 diabetes mellitus with hyperglycemia, with long-term current use of insulin (Primary)  -     SITagliptin (Januvia) 100 MG tablet; Take 1 tablet by mouth Daily.  Dispense: 90 tablet; Refill: 1  -     pioglitazone (ACTOS) 30 MG tablet; Take 1 tablet by mouth Daily.  Dispense: 90 tablet; Refill: 1  -     metFORMIN (GLUCOPHAGE) 850 MG tablet; TAKE ONE TABLET BY MOUTH THREE TIMES A DAY WITH A MEAL  Dispense: 270 tablet; Refill: 0  -     Insulin Glargine (BASAGLAR KWIKPEN) 100 UNIT/ML injection pen; 50-95 units SQ  Qd as dir  Dispense: 75 mL; Refill: 1  -     Hemoglobin A1c; Future    2. Primary hypertension  -     amLODIPine (NORVASC) 10 MG tablet; Take 1 tablet by mouth Daily.  Dispense: 90 " tablet; Refill: 3    3. Hypothyroidism, unspecified type  -     levothyroxine (SYNTHROID, LEVOTHROID) 75 MCG tablet; Take 1 tablet by mouth Daily.  Dispense: 90 tablet; Refill: 1    4. High risk medication use  -     CBC Auto Differential; Future  -     Comprehensive Metabolic Panel; Future     Labs reviewed with her     For her diabetes ---she has improved A1c is down to 7.4 encouraged her to keep up the good work watch her carb intake and she can will hopefully get out in the pool this summer get a more low impact exercise.  She knows to increase her Basaglar based on fasting blood sugar readings   she is currently on 44 units if her fasting sugars are above 150 on Sundays and Wednesdays she can increase her dose by 3 units she knows to check her sugars first thing in the morning fasting    Continue other meds as directed    She knows not to take WelChol with her Synthroid separate those by at least 4 hours and same thing with her vitamins.      Class 2 Severe Obesity (BMI >=35 and <=39.9). Obesity-related health conditions include the following: hypertension and diabetes mellitus. Obesity is unchanged. BMI is is above average; BMI management plan is completed. We discussed portion control and increasing exercise.      Follow Up:   Return in about 3 months (around 6/26/2024) for Recheck, Labs prior next visit.        Johny Brantley MD  [facility]   Portions of note created with Dragon voice recognition technology

## 2024-04-28 DIAGNOSIS — Z79.4 TYPE 2 DIABETES MELLITUS WITH HYPERGLYCEMIA, WITH LONG-TERM CURRENT USE OF INSULIN: ICD-10-CM

## 2024-04-28 DIAGNOSIS — E11.65 TYPE 2 DIABETES MELLITUS WITH HYPERGLYCEMIA, WITH LONG-TERM CURRENT USE OF INSULIN: ICD-10-CM

## 2024-04-29 DIAGNOSIS — I10 PRIMARY HYPERTENSION: ICD-10-CM

## 2024-04-29 RX ORDER — AMLODIPINE BESYLATE 10 MG/1
10 TABLET ORAL DAILY
Qty: 90 TABLET | Refills: 3 | OUTPATIENT
Start: 2024-04-29

## 2024-04-29 RX ORDER — PIOGLITAZONEHYDROCHLORIDE 30 MG/1
30 TABLET ORAL DAILY
Qty: 90 TABLET | Refills: 1 | OUTPATIENT
Start: 2024-04-29

## 2024-06-19 ENCOUNTER — LAB (OUTPATIENT)
Dept: FAMILY MEDICINE CLINIC | Facility: CLINIC | Age: 69
End: 2024-06-19
Payer: MEDICARE

## 2024-06-19 DIAGNOSIS — Z79.899 HIGH RISK MEDICATION USE: ICD-10-CM

## 2024-06-19 DIAGNOSIS — E11.65 TYPE 2 DIABETES MELLITUS WITH HYPERGLYCEMIA, WITH LONG-TERM CURRENT USE OF INSULIN: ICD-10-CM

## 2024-06-19 DIAGNOSIS — Z79.4 TYPE 2 DIABETES MELLITUS WITH HYPERGLYCEMIA, WITH LONG-TERM CURRENT USE OF INSULIN: ICD-10-CM

## 2024-06-19 PROCEDURE — 36415 COLL VENOUS BLD VENIPUNCTURE: CPT | Performed by: FAMILY MEDICINE

## 2024-06-20 LAB
ALBUMIN SERPL-MCNC: 4.3 G/DL (ref 3.9–4.9)
ALP SERPL-CCNC: 88 IU/L (ref 44–121)
ALT SERPL-CCNC: 25 IU/L (ref 0–32)
AST SERPL-CCNC: 23 IU/L (ref 0–40)
BASOPHILS # BLD AUTO: 0.1 X10E3/UL (ref 0–0.2)
BASOPHILS NFR BLD AUTO: 1 %
BILIRUB SERPL-MCNC: 0.4 MG/DL (ref 0–1.2)
BUN SERPL-MCNC: 26 MG/DL (ref 8–27)
BUN/CREAT SERPL: 23 (ref 12–28)
CALCIUM SERPL-MCNC: 10.4 MG/DL (ref 8.7–10.3)
CHLORIDE SERPL-SCNC: 106 MMOL/L (ref 96–106)
CO2 SERPL-SCNC: 19 MMOL/L (ref 20–29)
CREAT SERPL-MCNC: 1.15 MG/DL (ref 0.57–1)
EGFRCR SERPLBLD CKD-EPI 2021: 52 ML/MIN/1.73
EOSINOPHIL # BLD AUTO: 0.3 X10E3/UL (ref 0–0.4)
EOSINOPHIL NFR BLD AUTO: 4 %
ERYTHROCYTE [DISTWIDTH] IN BLOOD BY AUTOMATED COUNT: 14.3 % (ref 11.7–15.4)
GLOBULIN SER CALC-MCNC: 2.9 G/DL (ref 1.5–4.5)
GLUCOSE SERPL-MCNC: 87 MG/DL (ref 70–99)
HBA1C MFR BLD: 8.4 % (ref 4.8–5.6)
HCT VFR BLD AUTO: 38.7 % (ref 34–46.6)
HGB BLD-MCNC: 12.4 G/DL (ref 11.1–15.9)
IMM GRANULOCYTES # BLD AUTO: 0 X10E3/UL (ref 0–0.1)
IMM GRANULOCYTES NFR BLD AUTO: 1 %
LYMPHOCYTES # BLD AUTO: 2.3 X10E3/UL (ref 0.7–3.1)
LYMPHOCYTES NFR BLD AUTO: 31 %
MCH RBC QN AUTO: 28.5 PG (ref 26.6–33)
MCHC RBC AUTO-ENTMCNC: 32 G/DL (ref 31.5–35.7)
MCV RBC AUTO: 89 FL (ref 79–97)
MONOCYTES # BLD AUTO: 0.7 X10E3/UL (ref 0.1–0.9)
MONOCYTES NFR BLD AUTO: 10 %
NEUTROPHILS # BLD AUTO: 4 X10E3/UL (ref 1.4–7)
NEUTROPHILS NFR BLD AUTO: 53 %
PLATELET # BLD AUTO: 446 X10E3/UL (ref 150–450)
POTASSIUM SERPL-SCNC: 4.8 MMOL/L (ref 3.5–5.2)
PROT SERPL-MCNC: 7.2 G/DL (ref 6–8.5)
RBC # BLD AUTO: 4.35 X10E6/UL (ref 3.77–5.28)
SODIUM SERPL-SCNC: 141 MMOL/L (ref 134–144)
WBC # BLD AUTO: 7.5 X10E3/UL (ref 3.4–10.8)

## 2024-06-25 RX ORDER — PIOGLITAZONEHYDROCHLORIDE 45 MG/1
45 TABLET ORAL DAILY
Qty: 90 TABLET | Refills: 0 | Status: CANCELLED | OUTPATIENT
Start: 2024-06-25

## 2024-06-26 ENCOUNTER — OFFICE VISIT (OUTPATIENT)
Dept: FAMILY MEDICINE CLINIC | Facility: CLINIC | Age: 69
End: 2024-06-26
Payer: MEDICARE

## 2024-06-26 VITALS
SYSTOLIC BLOOD PRESSURE: 116 MMHG | WEIGHT: 242 LBS | HEART RATE: 76 BPM | OXYGEN SATURATION: 96 % | BODY MASS INDEX: 35.84 KG/M2 | DIASTOLIC BLOOD PRESSURE: 68 MMHG | HEIGHT: 69 IN

## 2024-06-26 DIAGNOSIS — Z79.4 TYPE 2 DIABETES MELLITUS WITH HYPERGLYCEMIA, WITH LONG-TERM CURRENT USE OF INSULIN: Primary | ICD-10-CM

## 2024-06-26 DIAGNOSIS — I10 BENIGN HYPERTENSION: ICD-10-CM

## 2024-06-26 DIAGNOSIS — I10 PRIMARY HYPERTENSION: ICD-10-CM

## 2024-06-26 DIAGNOSIS — E11.65 TYPE 2 DIABETES MELLITUS WITH HYPERGLYCEMIA, WITH LONG-TERM CURRENT USE OF INSULIN: Primary | ICD-10-CM

## 2024-06-26 DIAGNOSIS — Z79.899 HIGH RISK MEDICATION USE: ICD-10-CM

## 2024-06-26 DIAGNOSIS — I25.810 CORONARY ARTERY DISEASE INVOLVING CORONARY BYPASS GRAFT OF NATIVE HEART WITHOUT ANGINA PECTORIS: ICD-10-CM

## 2024-06-26 DIAGNOSIS — E78.2 MIXED HYPERLIPIDEMIA: ICD-10-CM

## 2024-06-26 PROCEDURE — 1126F AMNT PAIN NOTED NONE PRSNT: CPT | Performed by: FAMILY MEDICINE

## 2024-06-26 PROCEDURE — 3074F SYST BP LT 130 MM HG: CPT | Performed by: FAMILY MEDICINE

## 2024-06-26 PROCEDURE — 3052F HG A1C>EQUAL 8.0%<EQUAL 9.0%: CPT | Performed by: FAMILY MEDICINE

## 2024-06-26 PROCEDURE — 3078F DIAST BP <80 MM HG: CPT | Performed by: FAMILY MEDICINE

## 2024-06-26 PROCEDURE — 1159F MED LIST DOCD IN RCRD: CPT | Performed by: FAMILY MEDICINE

## 2024-06-26 PROCEDURE — 99214 OFFICE O/P EST MOD 30 MIN: CPT | Performed by: FAMILY MEDICINE

## 2024-06-26 PROCEDURE — 1160F RVW MEDS BY RX/DR IN RCRD: CPT | Performed by: FAMILY MEDICINE

## 2024-06-26 PROCEDURE — G2211 COMPLEX E/M VISIT ADD ON: HCPCS | Performed by: FAMILY MEDICINE

## 2024-06-26 RX ORDER — BISOPROLOL FUMARATE 5 MG/1
5 TABLET, FILM COATED ORAL DAILY
Qty: 90 TABLET | Refills: 1 | Status: SHIPPED | OUTPATIENT
Start: 2024-06-26

## 2024-06-26 RX ORDER — ATORVASTATIN CALCIUM 40 MG/1
40 TABLET, FILM COATED ORAL DAILY
Qty: 90 TABLET | Refills: 1 | Status: SHIPPED | OUTPATIENT
Start: 2024-06-26

## 2024-06-26 RX ORDER — GLIMEPIRIDE 4 MG/1
4 TABLET ORAL 2 TIMES DAILY
Qty: 180 TABLET | Refills: 0 | Status: SHIPPED | OUTPATIENT
Start: 2024-06-26

## 2024-06-26 RX ORDER — VALSARTAN 160 MG/1
160 TABLET ORAL DAILY
Qty: 90 TABLET | Refills: 1 | Status: SHIPPED | OUTPATIENT
Start: 2024-06-26

## 2024-06-26 RX ORDER — INSULIN GLARGINE 100 [IU]/ML
INJECTION, SOLUTION SUBCUTANEOUS
Qty: 75 ML | Refills: 1 | Status: SHIPPED | OUTPATIENT
Start: 2024-06-26

## 2024-06-26 RX ORDER — COLESEVELAM 180 1/1
1875 TABLET ORAL 2 TIMES DAILY WITH MEALS
Qty: 540 TABLET | Refills: 1 | Status: SHIPPED | OUTPATIENT
Start: 2024-06-26

## 2024-06-26 NOTE — PROGRESS NOTES
Follow Up Office Visit      Patient Name: Elisabet Harris  : 1955   MRN: [id number]     Chief Complaint:    Chief Complaint   Patient presents with    Follow-up       History of Present Illness: Elisabet Harris is a 68 y.o. female who is here today to   follow-up on her diabetes and chronic medical problems.  She has she has been doing well enjoying swimming with her grandkids etc.  But she is not able to walk to get much exercise there due to her knee pain she has been using a cane to get around she has had no falls however    She relates her sugar in the mornings running 80-90 and is doing well so she was surprised that her A1c bumped up.    She said she had vomiting when she tried to push up the dose of WelChol but is okay on 4 tablets she was try to gradually increase it to 5 she says    Review of Systems   Constitutional: Negative for fatigue and fever.   Respiratory: Negative for cough and shortness of breath.    Cardiovascular: Negative for chest pain and palpitations.   Skin: Negative for rash or itching      Subjective      Review of Systems:   Review of Systems    Past Medical History:   Past Medical History:   Diagnosis Date    Atrial fibrillation     post op    Coronary artery disease     Diabetes mellitus     Dyslipidemia     Hypertension     Hypothyroidism        Past Surgical History:   Past Surgical History:   Procedure Laterality Date    CORONARY ARTERY BYPASS GRAFT  2016    CORONARY STENT PLACEMENT      2003    HYSTERECTOMY         Family History:   Family History   Problem Relation Age of Onset    Mitral valve prolapse Mother     Heart failure Mother     Heart attack Father     Prostate cancer Father     Glaucoma Sister     Hyperlipidemia Brother        Social History:   Social History     Socioeconomic History    Marital status:     Number of children: 1   Tobacco Use    Smoking status: Never    Smokeless tobacco: Never   Vaping Use    Vaping status: Never Used  "  Substance and Sexual Activity    Alcohol use: No    Drug use: No    Sexual activity: Defer       Medications:     Current Outpatient Medications:     atorvastatin (LIPITOR) 40 MG tablet, Take 1 tablet by mouth Daily., Disp: 90 tablet, Rfl: 1    bisoprolol (ZEBeta) 5 MG tablet, Take 1 tablet by mouth Daily., Disp: 90 tablet, Rfl: 1    colesevelam (Welchol) 625 MG tablet, Take 3 tablets by mouth 2 (Two) Times a Day With Meals., Disp: 540 tablet, Rfl: 1    glimepiride (AMARYL) 4 MG tablet, Take 1 tablet by mouth 2 (Two) Times a Day., Disp: 180 tablet, Rfl: 0    Insulin Glargine (BASAGLAR KWIKPEN) 100 UNIT/ML injection pen, 50-95 units SQ  Qd as dir, Disp: 75 mL, Rfl: 1    metFORMIN (GLUCOPHAGE) 850 MG tablet, TAKE ONE TABLET BY MOUTH THREE TIMES A DAY WITH A MEAL, Disp: 270 tablet, Rfl: 0    valsartan (DIOVAN) 160 MG tablet, Take 1 tablet by mouth Daily., Disp: 90 tablet, Rfl: 1    amLODIPine (NORVASC) 10 MG tablet, Take 1 tablet by mouth Daily., Disp: 90 tablet, Rfl: 3    aspirin 81 MG EC tablet, Take 1 tablet by mouth Daily., Disp: , Rfl:     [employer] Pen Needles 31G X 6 MM misc, USE TO INJECT INSULIN AS DIRECTED DAILY, Disp: 100 each, Rfl: 3    levothyroxine (SYNTHROID, LEVOTHROID) 75 MCG tablet, Take 1 tablet by mouth Daily., Disp: 90 tablet, Rfl: 1    pioglitazone (ACTOS) 30 MG tablet, Take 1 tablet by mouth Daily., Disp: 90 tablet, Rfl: 1    SITagliptin (Januvia) 100 MG tablet, Take 1 tablet by mouth Daily., Disp: 90 tablet, Rfl: 1    Allergies:   Allergies   Allergen Reactions    Invokana [Canagliflozin] Unknown - Low Severity    Other Unknown - High Severity    Victoza [Liraglutide] Itching and Rash       Objective     Physical Exam:  Vital Signs:   Vitals:    06/26/24 0853   BP: 116/68   BP Location: Left arm   Patient Position: Sitting   Cuff Size: Large Adult   Pulse: 76   SpO2: 96%   Weight: 110 kg (242 lb)   Height: 175.3 cm (69\")     Facility age limit for growth %zaina is 20 years.  Body mass index is " 35.74 kg/m².     Physical Exam  Vitals and nursing note reviewed.   Constitutional:       Appearance: Normal appearance.   HENT:      Head: Normocephalic and atraumatic.   Cardiovascular:      Rate and Rhythm: Normal rate and regular rhythm.   Pulmonary:      Effort: Pulmonary effort is normal.      Breath sounds: Normal breath sounds.   Musculoskeletal:         General: No tenderness. Normal range of motion.      Cervical back: Normal range of motion and neck supple.      Right lower leg: Edema present.      Left lower leg: Edema present.      Comments: She has about 1+ edema both lower extremities and she walks with a cane   Skin:     General: Skin is warm and dry.   Neurological:      General: No focal deficit present.      Mental Status: She is alert.   Psychiatric:         Mood and Affect: Mood normal.         Behavior: Behavior normal.         Procedures    PHQ-9 Total Score:       Assessment / Plan      Assessment/Plan:   Diagnoses and all orders for this visit:    1. Type 2 diabetes mellitus with hyperglycemia, with long-term current use of insulin (Primary)  -     Comprehensive Metabolic Panel; Future  -     Hemoglobin A1c; Future  -     metFORMIN (GLUCOPHAGE) 850 MG tablet; TAKE ONE TABLET BY MOUTH THREE TIMES A DAY WITH A MEAL  Dispense: 270 tablet; Refill: 0  -     colesevelam (Welchol) 625 MG tablet; Take 3 tablets by mouth 2 (Two) Times a Day With Meals.  Dispense: 540 tablet; Refill: 1  -     Insulin Glargine (BASAGLAR KWIKPEN) 100 UNIT/ML injection pen; 50-95 units SQ  Qd as dir  Dispense: 75 mL; Refill: 1    2. High risk medication use    3. Primary hypertension    4. Mixed hyperlipidemia  -     colesevelam (Welchol) 625 MG tablet; Take 3 tablets by mouth 2 (Two) Times a Day With Meals.  Dispense: 540 tablet; Refill: 1    5. Coronary artery disease involving coronary bypass graft of native heart without angina pectoris  -     atorvastatin (LIPITOR) 40 MG tablet; Take 1 tablet by mouth Daily.   Dispense: 90 tablet; Refill: 1  -     bisoprolol (ZEBeta) 5 MG tablet; Take 1 tablet by mouth Daily.  Dispense: 90 tablet; Refill: 1    6. Benign hypertension  -     valsartan (DIOVAN) 160 MG tablet; Take 1 tablet by mouth Daily.  Dispense: 90 tablet; Refill: 1    Other orders  -     glimepiride (AMARYL) 4 MG tablet; Take 1 tablet by mouth 2 (Two) Times a Day.  Dispense: 180 tablet; Refill: 0         For her diabetes she is going to bump up the WelChol I encouraged her to cut back on carbs try to cut back on potatoes, rice, pasta, and breads and do more vegetables out of the garden and lean meats    Told her she could try to do her recumbent bike and see if she can do that for 30 minutes 5 days a week.    Continue with current meds as directed she is on 45 units of the Basaglar once a day and fasting sugars have been doing well.    We also discussed that she could check her sugars 2 hours after eating goal would be less than 180.    If she is not able to tolerate the increased dose of WelChol we can increase Actos to 45 mg she knows to watch for fluid buildup or any signs of heart failure.  She has not had any heart failure in her past    Also instructed her to take her fat-soluble vitamins 4 hours away from the dosing time of her well call and similar with her Synthroid she needs to separate WelChol and Synthroid by 4 hours as well.         Follow Up:   Return in about 3 months (around 9/16/2024) for Recheck, Labs prior next visit.        Johny Brantley MD  [facility]   Portions of note created with Dragon voice recognition technology

## 2024-07-22 ENCOUNTER — TRANSCRIBE ORDERS (OUTPATIENT)
Dept: CT IMAGING | Facility: CLINIC | Age: 69
End: 2024-07-22
Payer: COMMERCIAL

## 2024-07-22 DIAGNOSIS — N20.0 KIDNEY STONE: Primary | ICD-10-CM

## 2024-07-26 RX ORDER — PEN NEEDLE, DIABETIC 31 GX5/16"
NEEDLE, DISPOSABLE MISCELLANEOUS
Qty: 100 EACH | Refills: 2 | Status: SHIPPED | OUTPATIENT
Start: 2024-07-26

## 2024-07-26 NOTE — TELEPHONE ENCOUNTER
Rx Refill Note    Requested Prescriptions     Pending Prescriptions Disp Refills    B-D ULTRAFINE III SHORT PEN 31G X 8 MM misc [Pharmacy Med Name: BD UF SHORT PEN NEEDLE [id number]] 100 each 2     Sig: USE TO INJECT INSULIN AS DIRECTED DAILY        Last office visit with prescribing clinician: 6/26/2024      Next office visit with prescribing clinician: 9/17/2024   Last labs:   Last refill: needs   Pharmacy (be sure to add in Epic). correct

## 2024-09-10 ENCOUNTER — LAB (OUTPATIENT)
Dept: FAMILY MEDICINE CLINIC | Facility: CLINIC | Age: 69
End: 2024-09-10
Payer: MEDICARE

## 2024-09-10 DIAGNOSIS — E11.65 TYPE 2 DIABETES MELLITUS WITH HYPERGLYCEMIA, WITH LONG-TERM CURRENT USE OF INSULIN: ICD-10-CM

## 2024-09-10 DIAGNOSIS — Z79.4 TYPE 2 DIABETES MELLITUS WITH HYPERGLYCEMIA, WITH LONG-TERM CURRENT USE OF INSULIN: ICD-10-CM

## 2024-09-10 PROCEDURE — 36415 COLL VENOUS BLD VENIPUNCTURE: CPT | Performed by: FAMILY MEDICINE

## 2024-09-11 LAB
ALBUMIN SERPL-MCNC: 4.1 G/DL (ref 3.9–4.9)
ALP SERPL-CCNC: 81 IU/L (ref 44–121)
ALT SERPL-CCNC: 21 IU/L (ref 0–32)
AST SERPL-CCNC: 19 IU/L (ref 0–40)
BILIRUB SERPL-MCNC: 0.3 MG/DL (ref 0–1.2)
BUN SERPL-MCNC: 21 MG/DL (ref 8–27)
BUN/CREAT SERPL: 20 (ref 12–28)
CALCIUM SERPL-MCNC: 9.5 MG/DL (ref 8.7–10.3)
CHLORIDE SERPL-SCNC: 106 MMOL/L (ref 96–106)
CO2 SERPL-SCNC: 19 MMOL/L (ref 20–29)
CREAT SERPL-MCNC: 1.05 MG/DL (ref 0.57–1)
EGFRCR SERPLBLD CKD-EPI 2021: 57 ML/MIN/1.73
GLOBULIN SER CALC-MCNC: 2.8 G/DL (ref 1.5–4.5)
GLUCOSE SERPL-MCNC: 120 MG/DL (ref 70–99)
HBA1C MFR BLD: 7.6 % (ref 4.8–5.6)
POTASSIUM SERPL-SCNC: 4.8 MMOL/L (ref 3.5–5.2)
PROT SERPL-MCNC: 6.9 G/DL (ref 6–8.5)
SODIUM SERPL-SCNC: 141 MMOL/L (ref 134–144)

## 2024-09-17 ENCOUNTER — OFFICE VISIT (OUTPATIENT)
Dept: FAMILY MEDICINE CLINIC | Facility: CLINIC | Age: 69
End: 2024-09-17
Payer: MEDICARE

## 2024-09-17 VITALS
HEIGHT: 69 IN | HEART RATE: 68 BPM | DIASTOLIC BLOOD PRESSURE: 64 MMHG | SYSTOLIC BLOOD PRESSURE: 142 MMHG | WEIGHT: 249.3 LBS | BODY MASS INDEX: 36.92 KG/M2 | OXYGEN SATURATION: 97 %

## 2024-09-17 DIAGNOSIS — Z79.4 TYPE 2 DIABETES MELLITUS WITH HYPERGLYCEMIA, WITH LONG-TERM CURRENT USE OF INSULIN: Primary | ICD-10-CM

## 2024-09-17 DIAGNOSIS — Z79.899 HIGH RISK MEDICATION USE: ICD-10-CM

## 2024-09-17 DIAGNOSIS — E03.9 HYPOTHYROIDISM, UNSPECIFIED TYPE: ICD-10-CM

## 2024-09-17 DIAGNOSIS — E11.65 TYPE 2 DIABETES MELLITUS WITH HYPERGLYCEMIA, WITH LONG-TERM CURRENT USE OF INSULIN: Primary | ICD-10-CM

## 2024-09-17 DIAGNOSIS — R60.0 LEG EDEMA: ICD-10-CM

## 2024-09-17 PROCEDURE — 1126F AMNT PAIN NOTED NONE PRSNT: CPT | Performed by: FAMILY MEDICINE

## 2024-09-17 PROCEDURE — 3051F HG A1C>EQUAL 7.0%<8.0%: CPT | Performed by: FAMILY MEDICINE

## 2024-09-17 PROCEDURE — 99214 OFFICE O/P EST MOD 30 MIN: CPT | Performed by: FAMILY MEDICINE

## 2024-09-17 PROCEDURE — 1160F RVW MEDS BY RX/DR IN RCRD: CPT | Performed by: FAMILY MEDICINE

## 2024-09-17 PROCEDURE — 3077F SYST BP >= 140 MM HG: CPT | Performed by: FAMILY MEDICINE

## 2024-09-17 PROCEDURE — G2211 COMPLEX E/M VISIT ADD ON: HCPCS | Performed by: FAMILY MEDICINE

## 2024-09-17 PROCEDURE — 1159F MED LIST DOCD IN RCRD: CPT | Performed by: FAMILY MEDICINE

## 2024-09-17 PROCEDURE — 3078F DIAST BP <80 MM HG: CPT | Performed by: FAMILY MEDICINE

## 2024-09-17 RX ORDER — GLIMEPIRIDE 4 MG/1
4 TABLET ORAL 2 TIMES DAILY
Qty: 180 TABLET | Refills: 0 | Status: SHIPPED | OUTPATIENT
Start: 2024-09-17

## 2024-09-17 RX ORDER — LEVOTHYROXINE SODIUM 75 UG/1
75 TABLET ORAL DAILY
Qty: 90 TABLET | Refills: 1 | Status: SHIPPED | OUTPATIENT
Start: 2024-09-17

## 2024-09-17 RX ORDER — FUROSEMIDE 20 MG
20 TABLET ORAL DAILY
Qty: 30 TABLET | Refills: 0 | Status: SHIPPED | OUTPATIENT
Start: 2024-09-17

## 2024-09-17 RX ORDER — POTASSIUM CHLORIDE 750 MG/1
10 CAPSULE, EXTENDED RELEASE ORAL DAILY
Qty: 30 CAPSULE | Refills: 0 | Status: SHIPPED | OUTPATIENT
Start: 2024-09-17

## 2024-09-17 RX ORDER — PIOGLITAZONEHYDROCHLORIDE 30 MG/1
30 TABLET ORAL DAILY
Qty: 90 TABLET | Refills: 1 | Status: SHIPPED | OUTPATIENT
Start: 2024-09-17

## 2024-10-14 DIAGNOSIS — Z79.4 TYPE 2 DIABETES MELLITUS WITH HYPERGLYCEMIA, WITH LONG-TERM CURRENT USE OF INSULIN: ICD-10-CM

## 2024-10-14 DIAGNOSIS — E11.65 TYPE 2 DIABETES MELLITUS WITH HYPERGLYCEMIA, WITH LONG-TERM CURRENT USE OF INSULIN: ICD-10-CM

## 2024-11-06 ENCOUNTER — CLINICAL SUPPORT (OUTPATIENT)
Dept: FAMILY MEDICINE CLINIC | Facility: CLINIC | Age: 69
End: 2024-11-06
Payer: MEDICARE

## 2024-11-06 DIAGNOSIS — Z23 NEED FOR VACCINATION: Primary | ICD-10-CM

## 2024-11-06 PROCEDURE — 90662 IIV NO PRSV INCREASED AG IM: CPT | Performed by: FAMILY MEDICINE

## 2024-11-06 PROCEDURE — G0008 ADMIN INFLUENZA VIRUS VAC: HCPCS | Performed by: FAMILY MEDICINE

## 2024-12-12 ENCOUNTER — LAB (OUTPATIENT)
Dept: FAMILY MEDICINE CLINIC | Facility: CLINIC | Age: 69
End: 2024-12-12
Payer: MEDICARE

## 2024-12-12 DIAGNOSIS — Z79.4 TYPE 2 DIABETES MELLITUS WITH HYPERGLYCEMIA, WITH LONG-TERM CURRENT USE OF INSULIN: ICD-10-CM

## 2024-12-12 DIAGNOSIS — Z79.899 HIGH RISK MEDICATION USE: ICD-10-CM

## 2024-12-12 DIAGNOSIS — E11.65 TYPE 2 DIABETES MELLITUS WITH HYPERGLYCEMIA, WITH LONG-TERM CURRENT USE OF INSULIN: ICD-10-CM

## 2024-12-12 DIAGNOSIS — E03.9 HYPOTHYROIDISM, UNSPECIFIED TYPE: ICD-10-CM

## 2024-12-13 LAB
ALBUMIN SERPL-MCNC: 4.1 G/DL (ref 3.9–4.9)
ALP SERPL-CCNC: 92 IU/L (ref 44–121)
ALT SERPL-CCNC: 25 IU/L (ref 0–32)
AST SERPL-CCNC: 22 IU/L (ref 0–40)
BASOPHILS # BLD AUTO: 0.1 X10E3/UL (ref 0–0.2)
BASOPHILS NFR BLD AUTO: 1 %
BILIRUB SERPL-MCNC: 0.3 MG/DL (ref 0–1.2)
BUN SERPL-MCNC: 18 MG/DL (ref 8–27)
BUN/CREAT SERPL: 16 (ref 12–28)
CALCIUM SERPL-MCNC: 9.4 MG/DL (ref 8.7–10.3)
CHLORIDE SERPL-SCNC: 105 MMOL/L (ref 96–106)
CO2 SERPL-SCNC: 19 MMOL/L (ref 20–29)
CREAT SERPL-MCNC: 1.1 MG/DL (ref 0.57–1)
EGFRCR SERPLBLD CKD-EPI 2021: 54 ML/MIN/1.73
EOSINOPHIL # BLD AUTO: 0.4 X10E3/UL (ref 0–0.4)
EOSINOPHIL NFR BLD AUTO: 5 %
ERYTHROCYTE [DISTWIDTH] IN BLOOD BY AUTOMATED COUNT: 14.4 % (ref 11.7–15.4)
GLOBULIN SER CALC-MCNC: 2.8 G/DL (ref 1.5–4.5)
GLUCOSE SERPL-MCNC: 170 MG/DL (ref 70–99)
HBA1C MFR BLD: 8.7 % (ref 4.8–5.6)
HCT VFR BLD AUTO: 37.6 % (ref 34–46.6)
HGB BLD-MCNC: 12 G/DL (ref 11.1–15.9)
IMM GRANULOCYTES # BLD AUTO: 0.1 X10E3/UL (ref 0–0.1)
IMM GRANULOCYTES NFR BLD AUTO: 1 %
LYMPHOCYTES # BLD AUTO: 2.5 X10E3/UL (ref 0.7–3.1)
LYMPHOCYTES NFR BLD AUTO: 29 %
MCH RBC QN AUTO: 28.5 PG (ref 26.6–33)
MCHC RBC AUTO-ENTMCNC: 31.9 G/DL (ref 31.5–35.7)
MCV RBC AUTO: 89 FL (ref 79–97)
MONOCYTES # BLD AUTO: 0.7 X10E3/UL (ref 0.1–0.9)
MONOCYTES NFR BLD AUTO: 9 %
NEUTROPHILS # BLD AUTO: 4.7 X10E3/UL (ref 1.4–7)
NEUTROPHILS NFR BLD AUTO: 55 %
PLATELET # BLD AUTO: 450 X10E3/UL (ref 150–450)
POTASSIUM SERPL-SCNC: 4.8 MMOL/L (ref 3.5–5.2)
PROT SERPL-MCNC: 6.9 G/DL (ref 6–8.5)
RBC # BLD AUTO: 4.21 X10E6/UL (ref 3.77–5.28)
SODIUM SERPL-SCNC: 141 MMOL/L (ref 134–144)
TSH SERPL DL<=0.005 MIU/L-ACNC: 3.07 UIU/ML (ref 0.45–4.5)
WBC # BLD AUTO: 8.5 X10E3/UL (ref 3.4–10.8)

## 2024-12-24 DIAGNOSIS — R60.0 LEG EDEMA: ICD-10-CM

## 2024-12-26 RX ORDER — FUROSEMIDE 20 MG/1
20 TABLET ORAL DAILY PRN
Qty: 30 TABLET | Refills: 0 | OUTPATIENT
Start: 2024-12-26

## 2024-12-26 RX ORDER — POTASSIUM CHLORIDE 750 MG/1
CAPSULE, EXTENDED RELEASE ORAL
Qty: 30 CAPSULE | Refills: 0 | OUTPATIENT
Start: 2024-12-26

## 2025-01-07 ENCOUNTER — TELEMEDICINE (OUTPATIENT)
Dept: FAMILY MEDICINE CLINIC | Facility: CLINIC | Age: 70
End: 2025-01-07
Payer: MEDICARE

## 2025-01-07 DIAGNOSIS — E11.65 TYPE 2 DIABETES MELLITUS WITH HYPERGLYCEMIA, WITH LONG-TERM CURRENT USE OF INSULIN: ICD-10-CM

## 2025-01-07 DIAGNOSIS — I10 BENIGN HYPERTENSION: ICD-10-CM

## 2025-01-07 DIAGNOSIS — Z79.4 TYPE 2 DIABETES MELLITUS WITH HYPERGLYCEMIA, WITH LONG-TERM CURRENT USE OF INSULIN: ICD-10-CM

## 2025-01-07 DIAGNOSIS — E78.2 MIXED HYPERLIPIDEMIA: ICD-10-CM

## 2025-01-07 DIAGNOSIS — Z79.899 HIGH RISK MEDICATION USE: Primary | ICD-10-CM

## 2025-01-07 DIAGNOSIS — R60.0 LEG EDEMA: ICD-10-CM

## 2025-01-07 DIAGNOSIS — I25.810 CORONARY ARTERY DISEASE INVOLVING CORONARY BYPASS GRAFT OF NATIVE HEART WITHOUT ANGINA PECTORIS: ICD-10-CM

## 2025-01-07 PROCEDURE — G2211 COMPLEX E/M VISIT ADD ON: HCPCS | Performed by: FAMILY MEDICINE

## 2025-01-07 PROCEDURE — 1126F AMNT PAIN NOTED NONE PRSNT: CPT | Performed by: FAMILY MEDICINE

## 2025-01-07 PROCEDURE — 1159F MED LIST DOCD IN RCRD: CPT | Performed by: FAMILY MEDICINE

## 2025-01-07 PROCEDURE — 99214 OFFICE O/P EST MOD 30 MIN: CPT | Performed by: FAMILY MEDICINE

## 2025-01-07 PROCEDURE — 1160F RVW MEDS BY RX/DR IN RCRD: CPT | Performed by: FAMILY MEDICINE

## 2025-01-07 RX ORDER — FUROSEMIDE 20 MG/1
20 TABLET ORAL DAILY
Qty: 30 TABLET | Refills: 0 | Status: SHIPPED | OUTPATIENT
Start: 2025-01-07

## 2025-01-07 RX ORDER — GLIMEPIRIDE 4 MG/1
4 TABLET ORAL 2 TIMES DAILY
Qty: 180 TABLET | Refills: 0 | Status: SHIPPED | OUTPATIENT
Start: 2025-01-07

## 2025-01-07 RX ORDER — BISOPROLOL FUMARATE 5 MG/1
5 TABLET, FILM COATED ORAL DAILY
Qty: 90 TABLET | Refills: 1 | Status: SHIPPED | OUTPATIENT
Start: 2025-01-07

## 2025-01-07 RX ORDER — INSULIN GLARGINE 100 [IU]/ML
INJECTION, SOLUTION SUBCUTANEOUS
Qty: 75 ML | Refills: 1 | Status: SHIPPED | OUTPATIENT
Start: 2025-01-07

## 2025-01-07 RX ORDER — PEN NEEDLE, DIABETIC 31 GX5/16"
1 NEEDLE, DISPOSABLE MISCELLANEOUS DAILY
Qty: 100 EACH | Refills: 5 | Status: SHIPPED | OUTPATIENT
Start: 2025-01-07

## 2025-01-07 RX ORDER — POTASSIUM CHLORIDE 750 MG/1
10 CAPSULE, EXTENDED RELEASE ORAL DAILY
Qty: 30 CAPSULE | Refills: 0 | Status: SHIPPED | OUTPATIENT
Start: 2025-01-07

## 2025-01-07 RX ORDER — VALSARTAN 160 MG/1
160 TABLET ORAL DAILY
Qty: 90 TABLET | Refills: 1 | Status: SHIPPED | OUTPATIENT
Start: 2025-01-07

## 2025-01-07 RX ORDER — COLESEVELAM 180 1/1
1875 TABLET ORAL 2 TIMES DAILY WITH MEALS
Qty: 540 TABLET | Refills: 1 | Status: SHIPPED | OUTPATIENT
Start: 2025-01-07

## 2025-01-07 RX ORDER — ATORVASTATIN CALCIUM 40 MG/1
40 TABLET, FILM COATED ORAL DAILY
Qty: 90 TABLET | Refills: 1 | Status: SHIPPED | OUTPATIENT
Start: 2025-01-07

## 2025-01-07 NOTE — PROGRESS NOTES
Telehealth E-Visit      Patient Name: Elisabet Harris  : 1955   MRN: [id number]     Chief Complaint:  No chief complaint on file.      I have reviewed the E-Visit questionnaire and the patient's answers, my assessment and plan are listed below.   You have chosen to receive care through a telehealth visit.  Do you consent to use a video/audio connection for your medical care today? Yes     History of Present Illness: Elisabet Harris is a 69 y.o. female who is consenting to a telehealth visit today to follow up with follow-up on diabetes.  She is located in [place] at her house as am I.  She relates she knows why her A1c went up she is darted cutting back on the Actos down to 3 times a week and did not really have any specific reason for doing this but other than thought she was on too many medicines.  She is back on daily Actos says her sugars have been less than 100 in the mornings and doing much better.  She denies any fluid buildup she only uses the Lasix when she takes long trips or flights etc. and takes it with her potassium on a as needed basis    She does need many of her medicines refilled which we did    On review of systems:  No chest pains palpitation shortness of breath no stroke symptoms no cough no weakness in one-sided body or slurred speech etc.      Subjective      Review of Systems:   Review of Systems    I have reviewed and the following portions of the patient's history were updated as appropriate: past family history, past medical history, past social history, past surgical history and problem list.    Medications:     Current Outpatient Medications:     atorvastatin (LIPITOR) 40 MG tablet, Take 1 tablet by mouth Daily., Disp: 90 tablet, Rfl: 1    bisoprolol (ZEBeta) 5 MG tablet, Take 1 tablet by mouth Daily., Disp: 90 tablet, Rfl: 1    colesevelam (Welchol) 625 MG tablet, Take 3 tablets by mouth 2 (Two) Times a Day With Meals., Disp: 540 tablet, Rfl: 1    furosemide (Lasix) 20 MG  tablet, Take 1 tablet by mouth Daily. As  needed for  swelling, Disp: 30 tablet, Rfl: 0    glimepiride (AMARYL) 4 MG tablet, Take 1 tablet by mouth 2 (Two) Times a Day., Disp: 180 tablet, Rfl: 0    Insulin Glargine (BASAGLAR KWIKPEN) 100 UNIT/ML injection pen, 50-95 units SQ  Qd as dir, Disp: 75 mL, Rfl: 1    Insulin Pen Needle (B-D ULTRAFINE III SHORT PEN) 31G X 8 MM misc, Inject 1 each under the skin into the appropriate area as directed Daily., Disp: 100 each, Rfl: 5    metFORMIN (GLUCOPHAGE) 850 MG tablet, TAKE ONE TABLET BY MOUTH THREE TIMES A DAY WITH A MEAL, Disp: 270 tablet, Rfl: 0    potassium chloride (MICRO-K) 10 MEQ CR capsule, Take 1 capsule by mouth Daily. When on lasix, Disp: 30 capsule, Rfl: 0    valsartan (DIOVAN) 160 MG tablet, Take 1 tablet by mouth Daily., Disp: 90 tablet, Rfl: 1    amLODIPine (NORVASC) 10 MG tablet, Take 1 tablet by mouth Daily., Disp: 90 tablet, Rfl: 3    aspirin 81 MG EC tablet, Take 1 tablet by mouth Daily., Disp: , Rfl:     levothyroxine (SYNTHROID, LEVOTHROID) 75 MCG tablet, Take 1 tablet by mouth Daily., Disp: 90 tablet, Rfl: 1    pioglitazone (ACTOS) 30 MG tablet, Take 1 tablet by mouth Daily., Disp: 90 tablet, Rfl: 1    SITagliptin (Januvia) 100 MG tablet, Take 1 tablet by mouth Daily., Disp: 90 tablet, Rfl: 1    Allergies:   Allergies   Allergen Reactions    Invokana [Canagliflozin] Unknown - Low Severity    Other Unknown - High Severity    Victoza [Liraglutide] Itching and Rash       Objective     Physical Exam:  Vital Signs: There were no vitals filed for this visit.  There is no height or weight on file to calculate BMI.    Physical Exam  Constitutional:       Appearance: Normal appearance.      Comments: Alert oriented pleasant white female looks her normal self on video   HENT:      Head: Normocephalic and atraumatic.      Nose: Nose normal.   Pulmonary:      Effort: Pulmonary effort is normal.   Neurological:      General: No focal deficit present.      Mental  Status: She is alert.   Psychiatric:         Mood and Affect: Mood normal.         Behavior: Behavior normal.           Assessment / Plan      Assessment/Plan:   Diagnoses and all orders for this visit:    1. High risk medication use (Primary)  -     CK; Future  -     Comprehensive Metabolic Panel; Future  -     Lipid Panel; Future  -     Hemoglobin A1c; Future    2. Type 2 diabetes mellitus with hyperglycemia, with long-term current use of insulin  -     Insulin Glargine (BASAGLAR KWIKPEN) 100 UNIT/ML injection pen; 50-95 units SQ  Qd as dir  Dispense: 75 mL; Refill: 1  -     metFORMIN (GLUCOPHAGE) 850 MG tablet; TAKE ONE TABLET BY MOUTH THREE TIMES A DAY WITH A MEAL  Dispense: 270 tablet; Refill: 0  -     colesevelam (Welchol) 625 MG tablet; Take 3 tablets by mouth 2 (Two) Times a Day With Meals.  Dispense: 540 tablet; Refill: 1  -     CK; Future  -     Comprehensive Metabolic Panel; Future  -     Lipid Panel; Future  -     Hemoglobin A1c; Future    3. Coronary artery disease involving coronary bypass graft of native heart without angina pectoris  -     atorvastatin (LIPITOR) 40 MG tablet; Take 1 tablet by mouth Daily.  Dispense: 90 tablet; Refill: 1  -     bisoprolol (ZEBeta) 5 MG tablet; Take 1 tablet by mouth Daily.  Dispense: 90 tablet; Refill: 1    4. Mixed hyperlipidemia  -     colesevelam (Welchol) 625 MG tablet; Take 3 tablets by mouth 2 (Two) Times a Day With Meals.  Dispense: 540 tablet; Refill: 1  -     CK; Future  -     Comprehensive Metabolic Panel; Future  -     Lipid Panel; Future  -     Hemoglobin A1c; Future    5. Benign hypertension  -     valsartan (DIOVAN) 160 MG tablet; Take 1 tablet by mouth Daily.  Dispense: 90 tablet; Refill: 1    6. Leg edema  -     furosemide (Lasix) 20 MG tablet; Take 1 tablet by mouth Daily. As  needed for  swelling  Dispense: 30 tablet; Refill: 0  -     potassium chloride (MICRO-K) 10 MEQ CR capsule; Take 1 capsule by mouth Daily. When on lasix  Dispense: 30 capsule;  Refill: 0    Other orders  -     glimepiride (AMARYL) 4 MG tablet; Take 1 tablet by mouth 2 (Two) Times a Day.  Dispense: 180 tablet; Refill: 0  -     Insulin Pen Needle (B-D ULTRAFINE III SHORT PEN) 31G X 8 MM misc; Inject 1 each under the skin into the appropriate area as directed Daily.  Dispense: 100 each; Refill: 5    Med medicines refilled we will continue with her current medications as is back on the full dose of Actos daily    She is on Basaglar 42 units/day and blood sugars in the morning and been less than 100    Blood pressure at home has been running in the 120/70 she will continue to monitor    Encouraged her to stay on good diet she is not really able to exercise due to her knee so she will have to work extra hard at cutting back on carbs eating more fruits vegetables and lean meats like grilled chicken breast without the scan turkey or fish grilled or baked not deep-fried and will follow-up in 3 months and do annual wellness at that point.    Follow Up:   Return in about 2 months (around 3/12/2025) for Recheck, F/U AWV, Labs prior next visit.    Any medications prescribed have been sent electronically to   [facility] [id number] - [place], [place] - [address] AT [address] - [phone] PH - [phone] FX  [address] [address]  Phone: [phone] Fax: [phone]      20 minutes were spent reviewing the patient's questionnaire, formulating a treatment plan, and relaying information to the patient via [employer].    Johny Brantley MD  [facility]   01/07/25  08:11 EST

## 2025-05-01 DIAGNOSIS — I10 PRIMARY HYPERTENSION: ICD-10-CM

## 2025-05-02 RX ORDER — AMLODIPINE BESYLATE 10 MG/1
10 TABLET ORAL DAILY
Qty: 90 TABLET | Refills: 0 | Status: SHIPPED | OUTPATIENT
Start: 2025-05-02

## 2025-05-19 NOTE — TELEPHONE ENCOUNTER
MEDICATION WILL BE FILLED AT APPOINTMENT   
PAST MEDICAL HISTORY:  No pertinent past medical history

## 2025-05-30 DIAGNOSIS — Z79.4 TYPE 2 DIABETES MELLITUS WITH HYPERGLYCEMIA, WITH LONG-TERM CURRENT USE OF INSULIN: ICD-10-CM

## 2025-05-30 DIAGNOSIS — E03.9 HYPOTHYROIDISM, UNSPECIFIED TYPE: ICD-10-CM

## 2025-05-30 DIAGNOSIS — E11.65 TYPE 2 DIABETES MELLITUS WITH HYPERGLYCEMIA, WITH LONG-TERM CURRENT USE OF INSULIN: ICD-10-CM

## 2025-06-02 RX ORDER — LEVOTHYROXINE SODIUM 75 UG/1
75 TABLET ORAL DAILY
Qty: 90 TABLET | Refills: 0 | Status: SHIPPED | OUTPATIENT
Start: 2025-06-02

## 2025-06-02 RX ORDER — PIOGLITAZONE 30 MG/1
30 TABLET ORAL DAILY
Qty: 90 TABLET | Refills: 0 | Status: SHIPPED | OUTPATIENT
Start: 2025-06-02

## 2025-06-07 DIAGNOSIS — E11.65 TYPE 2 DIABETES MELLITUS WITH HYPERGLYCEMIA, WITH LONG-TERM CURRENT USE OF INSULIN: ICD-10-CM

## 2025-06-07 DIAGNOSIS — Z79.4 TYPE 2 DIABETES MELLITUS WITH HYPERGLYCEMIA, WITH LONG-TERM CURRENT USE OF INSULIN: ICD-10-CM

## 2025-06-07 RX ORDER — SITAGLIPTIN 100 MG/1
100 TABLET, FILM COATED ORAL DAILY
Qty: 90 TABLET | Refills: 1 | Status: SHIPPED | OUTPATIENT
Start: 2025-06-07

## 2025-06-25 ENCOUNTER — LAB (OUTPATIENT)
Dept: FAMILY MEDICINE CLINIC | Facility: CLINIC | Age: 70
End: 2025-06-25
Payer: MEDICARE

## 2025-06-25 DIAGNOSIS — E11.65 TYPE 2 DIABETES MELLITUS WITH HYPERGLYCEMIA, WITH LONG-TERM CURRENT USE OF INSULIN: ICD-10-CM

## 2025-06-25 DIAGNOSIS — E78.2 MIXED HYPERLIPIDEMIA: ICD-10-CM

## 2025-06-25 DIAGNOSIS — Z79.899 HIGH RISK MEDICATION USE: ICD-10-CM

## 2025-06-25 DIAGNOSIS — Z79.4 TYPE 2 DIABETES MELLITUS WITH HYPERGLYCEMIA, WITH LONG-TERM CURRENT USE OF INSULIN: ICD-10-CM

## 2025-06-26 LAB
ALBUMIN SERPL-MCNC: 4.2 G/DL (ref 3.9–4.9)
ALP SERPL-CCNC: 86 IU/L (ref 44–121)
ALT SERPL-CCNC: 25 IU/L (ref 0–32)
AST SERPL-CCNC: 26 IU/L (ref 0–40)
BILIRUB SERPL-MCNC: 0.3 MG/DL (ref 0–1.2)
BUN SERPL-MCNC: 27 MG/DL (ref 8–27)
BUN/CREAT SERPL: 28 (ref 12–28)
CALCIUM SERPL-MCNC: 9.4 MG/DL (ref 8.7–10.3)
CHLORIDE SERPL-SCNC: 105 MMOL/L (ref 96–106)
CHOLEST SERPL-MCNC: 141 MG/DL (ref 100–199)
CK SERPL-CCNC: 63 U/L (ref 32–182)
CO2 SERPL-SCNC: 17 MMOL/L (ref 20–29)
CREAT SERPL-MCNC: 0.98 MG/DL (ref 0.57–1)
EGFRCR SERPLBLD CKD-EPI 2021: 62 ML/MIN/1.73
GLOBULIN SER CALC-MCNC: 3 G/DL (ref 1.5–4.5)
GLUCOSE SERPL-MCNC: 147 MG/DL (ref 70–99)
HBA1C MFR BLD: 8 % (ref 4.8–5.6)
HDLC SERPL-MCNC: 43 MG/DL
LDLC SERPL CALC-MCNC: 74 MG/DL (ref 0–99)
POTASSIUM SERPL-SCNC: 5 MMOL/L (ref 3.5–5.2)
PROT SERPL-MCNC: 7.2 G/DL (ref 6–8.5)
SODIUM SERPL-SCNC: 140 MMOL/L (ref 134–144)
TRIGL SERPL-MCNC: 139 MG/DL (ref 0–149)
VLDLC SERPL CALC-MCNC: 24 MG/DL (ref 5–40)

## 2025-07-03 ENCOUNTER — OFFICE VISIT (OUTPATIENT)
Dept: FAMILY MEDICINE CLINIC | Facility: CLINIC | Age: 70
End: 2025-07-03
Payer: MEDICARE

## 2025-07-03 VITALS
HEIGHT: 69 IN | DIASTOLIC BLOOD PRESSURE: 74 MMHG | WEIGHT: 246.8 LBS | HEART RATE: 66 BPM | RESPIRATION RATE: 16 BRPM | OXYGEN SATURATION: 97 % | BODY MASS INDEX: 36.56 KG/M2 | SYSTOLIC BLOOD PRESSURE: 134 MMHG

## 2025-07-03 DIAGNOSIS — N95.9 AT RISK FOR OSTEOPOROSIS IN PREMENOPAUSAL PATIENT: ICD-10-CM

## 2025-07-03 DIAGNOSIS — I25.810 CORONARY ARTERY DISEASE INVOLVING CORONARY BYPASS GRAFT OF NATIVE HEART WITHOUT ANGINA PECTORIS: ICD-10-CM

## 2025-07-03 DIAGNOSIS — E78.2 MIXED HYPERLIPIDEMIA: ICD-10-CM

## 2025-07-03 DIAGNOSIS — I10 BENIGN HYPERTENSION: ICD-10-CM

## 2025-07-03 DIAGNOSIS — Z00.00 ENCOUNTER FOR SUBSEQUENT ANNUAL WELLNESS VISIT (AWV) IN MEDICARE PATIENT: Primary | ICD-10-CM

## 2025-07-03 DIAGNOSIS — E11.65 TYPE 2 DIABETES MELLITUS WITH HYPERGLYCEMIA, WITH LONG-TERM CURRENT USE OF INSULIN: ICD-10-CM

## 2025-07-03 DIAGNOSIS — E03.9 HYPOTHYROIDISM, UNSPECIFIED TYPE: ICD-10-CM

## 2025-07-03 DIAGNOSIS — Z12.31 BREAST CANCER SCREENING BY MAMMOGRAM: ICD-10-CM

## 2025-07-03 DIAGNOSIS — I10 PRIMARY HYPERTENSION: ICD-10-CM

## 2025-07-03 DIAGNOSIS — Z79.899 HIGH RISK MEDICATION USE: ICD-10-CM

## 2025-07-03 DIAGNOSIS — Z79.4 TYPE 2 DIABETES MELLITUS WITH HYPERGLYCEMIA, WITH LONG-TERM CURRENT USE OF INSULIN: ICD-10-CM

## 2025-07-03 DIAGNOSIS — Z91.89 AT RISK FOR OSTEOPOROSIS IN PREMENOPAUSAL PATIENT: ICD-10-CM

## 2025-07-03 RX ORDER — LEVOTHYROXINE SODIUM 75 UG/1
75 TABLET ORAL DAILY
Qty: 90 TABLET | Refills: 1 | Status: SHIPPED | OUTPATIENT
Start: 2025-07-03

## 2025-07-03 RX ORDER — GLIMEPIRIDE 4 MG/1
4 TABLET ORAL 2 TIMES DAILY
Qty: 180 TABLET | Refills: 1 | Status: SHIPPED | OUTPATIENT
Start: 2025-07-03

## 2025-07-03 RX ORDER — VALSARTAN 160 MG/1
160 TABLET ORAL DAILY
Qty: 90 TABLET | Refills: 1 | Status: SHIPPED | OUTPATIENT
Start: 2025-07-03

## 2025-07-03 RX ORDER — AMLODIPINE BESYLATE 10 MG/1
10 TABLET ORAL DAILY
Qty: 90 TABLET | Refills: 1 | Status: SHIPPED | OUTPATIENT
Start: 2025-07-03

## 2025-07-03 RX ORDER — COLESEVELAM 180 1/1
1875 TABLET ORAL 2 TIMES DAILY WITH MEALS
Qty: 540 TABLET | Refills: 1 | Status: SHIPPED | OUTPATIENT
Start: 2025-07-03

## 2025-07-03 RX ORDER — BISOPROLOL FUMARATE 5 MG/1
5 TABLET, FILM COATED ORAL DAILY
Qty: 90 TABLET | Refills: 1 | Status: SHIPPED | OUTPATIENT
Start: 2025-07-03

## 2025-07-03 RX ORDER — INSULIN GLARGINE 100 [IU]/ML
INJECTION, SOLUTION SUBCUTANEOUS
Qty: 75 ML | Refills: 1 | Status: SHIPPED | OUTPATIENT
Start: 2025-07-03

## 2025-07-03 RX ORDER — ATORVASTATIN CALCIUM 40 MG/1
40 TABLET, FILM COATED ORAL DAILY
Qty: 90 TABLET | Refills: 1 | Status: SHIPPED | OUTPATIENT
Start: 2025-07-03

## 2025-07-03 NOTE — PATIENT INSTRUCTIONS
Health Maintenance, Female  Adopting a healthy lifestyle and getting preventive care can go a long way to promote health and wellness. Talk with your health care provider about what schedule of regular examinations is right for you. This is a good chance for you to check in with your provider about disease prevention and staying healthy.  In between checkups, there are plenty of things you can do on your own. Experts have done a lot of research about which lifestyle changes and preventive measures are most likely to keep you healthy. Ask your health care provider for more information.  Weight and diet  Eat a healthy diet  Be sure to include plenty of vegetables, fruits, low-fat dairy products, and lean protein.  Do not eat a lot of foods high in solid fats, added sugars, or salt.  Get regular exercise. This is one of the most important things you can do for your health.  Most adults should exercise for at least 150 minutes each week. The exercise should increase your heart rate and make you sweat (moderate-intensity exercise).  Most adults should also do strengthening exercises at least twice a week. This is in addition to the moderate-intensity exercise.     Maintain a healthy weight  Body mass index (BMI) is a measurement that can be used to identify possible weight problems. It estimates body fat based on height and weight. Your health care provider can help determine your BMI and help you achieve or maintain a healthy weight.  For females 20 years of age and older:  A BMI below 18.5 is considered underweight.  A BMI of 18.5 to 24.9 is normal.  A BMI of 25 to 29.9 is considered overweight.  A BMI of 30 and above is considered obese.     Watch levels of cholesterol and blood lipids  You should start having your blood tested for lipids and cholesterol at 20 years of age, then have this test every 5 years.  You may need to have your cholesterol levels checked more often if:  Your lipid or cholesterol levels are  high.  You are older than 50 years of age.  You are at high risk for heart disease.     Cancer screening  Lung Cancer  Lung cancer screening is recommended for adults 55-80 years old who are at high risk for lung cancer because of a history of smoking.  A yearly low-dose CT scan of the lungs is recommended for people who:  Currently smoke.  Have quit within the past 15 years.  Have at least a 30-pack-year history of smoking. A pack year is smoking an average of one pack of cigarettes a day for 1 year.  Yearly screening should continue until it has been 15 years since you quit.  Yearly screening should stop if you develop a health problem that would prevent you from having lung cancer treatment.     Breast Cancer  Practice breast self-awareness. This means understanding how your breasts normally appear and feel.  It also means doing regular breast self-exams. Let your health care provider know about any changes, no matter how small.  If you are in your 20s or 30s, you should have a clinical breast exam (CBE) by a health care provider every 1-3 years as part of a regular health exam.  If you are 40 or older, have a CBE every year. Also consider having a breast X-ray (mammogram) every year.  If you have a family history of breast cancer, talk to your health care provider about genetic screening.  If you are at high risk for breast cancer, talk to your health care provider about having an MRI and a mammogram every year.  Breast cancer gene (BRCA) assessment is recommended for women who have family members with BRCA-related cancers. BRCA-related cancers include:  Breast.  Ovarian.  Tubal.  Peritoneal cancers.  Results of the assessment will determine the need for genetic counseling and BRCA1 and BRCA2 testing.     Cervical Cancer  Your health care provider may recommend that you be screened regularly for cancer of the pelvic organs (ovaries, uterus, and vagina). This screening involves a pelvic examination, including  checking for microscopic changes to the surface of your cervix (Pap test). You may be encouraged to have this screening done every 3 years, beginning at age 21.  For women ages 30-65, health care providers may recommend pelvic exams and Pap testing every 3 years, or they may recommend the Pap and pelvic exam, combined with testing for human papilloma virus (HPV), every 5 years. Some types of HPV increase your risk of cervical cancer. Testing for HPV may also be done on women of any age with unclear Pap test results.  Other health care providers may not recommend any screening for nonpregnant women who are considered low risk for pelvic cancer and who do not have symptoms. Ask your health care provider if a screening pelvic exam is right for you.  If you have had past treatment for cervical cancer or a condition that could lead to cancer, you need Pap tests and screening for cancer for at least 20 years after your treatment. If Pap tests have been discontinued, your risk factors (such as having a new sexual partner) need to be reassessed to determine if screening should resume. Some women have medical problems that increase the chance of getting cervical cancer. In these cases, your health care provider may recommend more frequent screening and Pap tests.     Colorectal Cancer  This type of cancer can be detected and often prevented.  Routine colorectal cancer screening usually begins at 50 years of age and continues through 75 years of age.  Your health care provider may recommend screening at an earlier age if you have risk factors for colon cancer.  Your health care provider may also recommend using home test kits to check for hidden blood in the stool.  A small camera at the end of a tube can be used to examine your colon directly (sigmoidoscopy or colonoscopy). This is done to check for the earliest forms of colorectal cancer.  Routine screening usually begins at age 50.  Direct examination of the colon should  be repeated every 5-10 years through 75 years of age. However, you may need to be screened more often if early forms of precancerous polyps or small growths are found.     Skin Cancer  Check your skin from head to toe regularly.  Tell your health care provider about any new moles or changes in moles, especially if there is a change in a mole's shape or color.  Also tell your health care provider if you have a mole that is larger than the size of a pencil eraser.  Always use sunscreen. Apply sunscreen liberally and repeatedly throughout the day.  Protect yourself by wearing long sleeves, pants, a wide-brimmed hat, and sunglasses whenever you are outside.     Heart disease, diabetes, and high blood pressure  High blood pressure causes heart disease and increases the risk of stroke. High blood pressure is more likely to develop in:  People who have blood pressure in the high end of the normal range (130-139/85-89 mm Hg).  People who are overweight or obese.  People who are .  If you are 18-39 years of age, have your blood pressure checked every 3-5 years. If you are 40 years of age or older, have your blood pressure checked every year. You should have your blood pressure measured twice--once when you are at a hospital or clinic, and once when you are not at a hospital or clinic. Record the average of the two measurements. To check your blood pressure when you are not at a hospital or clinic, you can use:  An automated blood pressure machine at a pharmacy.  A home blood pressure monitor.  If you are between 55 years and 79 years old, ask your health care provider if you should take aspirin to prevent strokes.  Have regular diabetes screenings. This involves taking a blood sample to check your fasting blood sugar level.  If you are at a normal weight and have a low risk for diabetes, have this test once every three years after 45 years of age.  If you are overweight and have a high risk for diabetes,  consider being tested at a younger age or more often.  Preventing infection  Hepatitis B  If you have a higher risk for hepatitis B, you should be screened for this virus. You are considered at high risk for hepatitis B if:  You were born in a country where hepatitis B is common. Ask your health care provider which countries are considered high risk.  Your parents were born in a high-risk country, and you have not been immunized against hepatitis B (hepatitis B vaccine).  You have HIV or AIDS.  You use needles to inject street drugs.  You live with someone who has hepatitis B.  You have had sex with someone who has hepatitis B.  You get hemodialysis treatment.  You take certain medicines for conditions, including cancer, organ transplantation, and autoimmune conditions.     Hepatitis C  Blood testing is recommended for:  Everyone born from 1945 through 1965.  Anyone with known risk factors for hepatitis C.     Sexually transmitted infections (STIs)  You should be screened for sexually transmitted infections (STIs) including gonorrhea and chlamydia if:  You are sexually active and are younger than 24 years of age.  You are older than 24 years of age and your health care provider tells you that you are at risk for this type of infection.  Your sexual activity has changed since you were last screened and you are at an increased risk for chlamydia or gonorrhea. Ask your health care provider if you are at risk.  If you do not have HIV, but are at risk, it may be recommended that you take a prescription medicine daily to prevent HIV infection. This is called pre-exposure prophylaxis (PrEP). You are considered at risk if:  You are sexually active and do not regularly use condoms or know the HIV status of your partner(s).  You take drugs by injection.  You are sexually active with a partner who has HIV.     Talk with your health care provider about whether you are at high risk of being infected with HIV. If you choose to  begin PrEP, you should first be tested for HIV. You should then be tested every 3 months for as long as you are taking PrEP.  Pregnancy  If you are premenopausal and you may become pregnant, ask your health care provider about preconception counseling.  If you may become pregnant, take 400 to 800 micrograms (mcg) of folic acid every day.  If you want to prevent pregnancy, talk to your health care provider about birth control (contraception).  Osteoporosis and menopause  Osteoporosis is a disease in which the bones lose minerals and strength with aging. This can result in serious bone fractures. Your risk for osteoporosis can be identified using a bone density scan.  If you are 65 years of age or older, or if you are at risk for osteoporosis and fractures, ask your health care provider if you should be screened.  Ask your health care provider whether you should take a calcium or vitamin D supplement to lower your risk for osteoporosis.  Menopause may have certain physical symptoms and risks.  Hormone replacement therapy may reduce some of these symptoms and risks.  Talk to your health care provider about whether hormone replacement therapy is right for you.  Follow these instructions at home:  Schedule regular health, dental, and eye exams.  Stay current with your immunizations.  Do not use any tobacco products including cigarettes, chewing tobacco, or electronic cigarettes.  If you are pregnant, do not drink alcohol.  If you are breastfeeding, limit how much and how often you drink alcohol.  Limit alcohol intake to no more than 1 drink per day for nonpregnant women. One drink equals 12 ounces of beer, 5 ounces of wine, or 1½ ounces of hard liquor.  Do not use street drugs.  Do not share needles.  Ask your health care provider for help if you need support or information about quitting drugs.  Tell your health care provider if you often feel depressed.  Tell your health care provider if you have ever been abused or do  not feel safe at home.  This information is not intended to replace advice given to you by your health care provider. Make sure you discuss any questions you have with your health care provider.  Document Released: 07/02/2012 Document Revised: 05/25/2017 Document Reviewed: 09/20/2016  [employer] Patient Education © 2018 [employer]

## 2025-07-03 NOTE — PROGRESS NOTES
Subjective   The ABCs of the Annual Wellness Visit  Medicare Wellness Visit      Elisabet Harris is a 69 y.o. patient who presents for a Medicare Wellness Visit.    The following portions of the patient's history were reviewed and   updated as appropriate: allergies, current medications, past family history, past medical history, past social history, past surgical history, and problem list.    Compared to one year ago, the patient's physical   health is the same.  Compared to one year ago, the patient's mental   health is the same.    Recent Hospitalizations:  She was not admitted to the hospital during the last year.     Current Medical Providers:  Patient Care Team:  Johny Brantley MD as PCP - General  Johny Brantley MD as PCP - Family Medicine    Outpatient Medications Prior to Visit   Medication Sig Dispense Refill    aspirin 81 MG EC tablet Take 1 tablet by mouth Daily.      furosemide (Lasix) 20 MG tablet Take 1 tablet by mouth Daily. As  needed for  swelling 30 tablet 0    Insulin Pen Needle (B-D ULTRAFINE III SHORT PEN) 31G X 8 MM misc Inject 1 each under the skin into the appropriate area as directed Daily. 100 each 5    Januvia 100 MG tablet TAKE 1 TABLET BY MOUTH DAILY 90 tablet 1    metFORMIN (GLUCOPHAGE) 850 MG tablet TAKE 1 TABLET BY MOUTH 3 TIMES A DAY WITH A MEAL 270 tablet 0    pioglitazone (ACTOS) 30 MG tablet TAKE 1 TABLET BY MOUTH DAILY 90 tablet 0    potassium chloride (MICRO-K) 10 MEQ CR capsule Take 1 capsule by mouth Daily. When on lasix 30 capsule 0    amLODIPine (NORVASC) 10 MG tablet TAKE 1 TABLET BY MOUTH DAILY 90 tablet 0    atorvastatin (LIPITOR) 40 MG tablet Take 1 tablet by mouth Daily. 90 tablet 1    bisoprolol (ZEBeta) 5 MG tablet Take 1 tablet by mouth Daily. 90 tablet 1    colesevelam (Welchol) 625 MG tablet Take 3 tablets by mouth 2 (Two) Times a Day With Meals. 540 tablet 1    glimepiride (AMARYL) 4 MG tablet Take 1 tablet by mouth 2 (Two) Times a Day. 180 tablet 0    Insulin  "Glargine (BASAGLAR KWIKPEN) 100 UNIT/ML injection pen 50-95 units SQ  Qd as dir 75 mL 1    levothyroxine (SYNTHROID, LEVOTHROID) 75 MCG tablet TAKE 1 TABLET BY MOUTH DAILY 90 tablet 0    valsartan (DIOVAN) 160 MG tablet Take 1 tablet by mouth Daily. 90 tablet 1     No facility-administered medications prior to visit.     No opioid medication identified on active medication list. I have reviewed chart for other potential  high risk medication/s and harmful drug interactions in the elderly.      Aspirin is on active medication list. Aspirin use is indicated based on review of current medical condition/s. Pros and cons of this therapy have been discussed today. Benefits of this medication outweigh potential harm.  Patient has been encouraged to continue taking this medication.  .      Patient Active Problem List   Diagnosis    Coronary artery disease involving coronary bypass graft of native heart without angina pectoris    Arthritis of knee    Benign hypertension    Hearing loss    Hypothyroidism    Ischemic cardiomyopathy    Mixed hyperlipidemia    Obesity (BMI 30.0-34.9)    Sleep apnea    Type 2 diabetes mellitus    Ureteric stone     Advance Care Planning Advance Directive is not on file.  ACP discussion was held with the patient during this visit. Patient does not have an advance directive, information provided.            Objective   Vitals:    07/03/25 0951   BP: 134/74   Pulse: 66   Resp: 16   SpO2: 97%   Weight: 112 kg (246 lb 12.8 oz)   Height: 175.3 cm (69\")   PainSc: 0-No pain       Estimated body mass index is 36.45 kg/m² as calculated from the following:    Height as of this encounter: 175.3 cm (69\").    Weight as of this encounter: 112 kg (246 lb 12.8 oz).    Class 2 Severe Obesity (BMI >=35 and <=39.9). Obesity-related health conditions include the following: hypertension, coronary heart disease, and diabetes mellitus. Obesity is unchanged. BMI is is above average; BMI management plan is completed. We " discussed portion control and increasing exercise.           Does the patient have evidence of cognitive impairment? No  Lab Results   Component Value Date    CHLPL 141 2025    TRIG 139 2025    HDL 43 2025    LDL 74 2025    VLDL 24 2025    HGBA1C 8.0 (H) 2025                                                                                                Health  Risk Assessment    Smoking Status:  Social History     Tobacco Use   Smoking Status Never    Passive exposure: Never   Smokeless Tobacco Never     Alcohol Consumption:  Social History     Substance and Sexual Activity   Alcohol Use Not Currently       Fall Risk Screen  STEADI Fall Risk Assessment was completed, and patient is at MODERATE risk for falls. Assessment completed on:7/3/2025    Depression Screening   Little interest or pleasure in doing things? Not at all   Feeling down, depressed, or hopeless? Not at all   PHQ-2 Total Score 0      Health Habits and Functional and Cognitive Screenin/26/2025     9:00 AM   Functional & Cognitive Status   Do you have difficulty preparing food and eating? No   Do you have difficulty bathing yourself, getting dressed or grooming yourself? No   Do you have difficulty using the toilet? No   Do you have difficulty moving around from place to place? No   Do you have trouble with steps or getting out of a bed or a chair? No   Current Diet Well Balanced Diet   Dental Exam Up to date   Eye Exam Up to date   Exercise (times per week) 3 times per week   Current Exercises Include Swimming   Do you need help using the phone?  No   Are you deaf or do you have serious difficulty hearing?  Yes   Do you need help to go to places out of walking distance? No   Do you need help shopping? No   Do you need help preparing meals?  No   Do you need help with housework?  No   Do you need help with laundry? No   Do you need help taking your medications? No   Do you need help managing money? No   Do  you ever drive or ride in a car without wearing a seat belt? No   Have you felt unusual fatigue (could be tiredness), stress, anger or loneliness in the last month? No   Who do you live with? Spouse   If you need help, do you have trouble finding someone available to you? No   Have you been bothered in the last four weeks by sexual problems? No   Do you have difficulty concentrating, remembering or making decisions? No           Age-appropriate Screening Schedule:  Refer to the list below for future screening recommendations based on patient's age, sex and/or medical conditions. Orders for these recommended tests are listed in the plan section. The patient has been provided with a written plan.    Health Maintenance List  Health Maintenance   Topic Date Due    URINE MICROALBUMIN-CREATININE RATIO (uACR)  04/13/2022    ANNUAL WELLNESS VISIT  01/02/2025    INFLUENZA VACCINE  07/01/2025    MAMMOGRAM  07/25/2025    DXA SCAN  07/25/2025    COVID-19 Vaccine (5 - 2024-25 season) 07/17/2025 (Originally 9/1/2024)    DIABETIC FOOT EXAM  09/17/2025    HEMOGLOBIN A1C  12/25/2025    DIABETIC EYE EXAM  01/25/2026    LIPID PANEL  06/25/2026    TDAP/TD VACCINES (2 - Td or Tdap) 10/07/2026    COLORECTAL CANCER SCREENING  05/08/2027    HEPATITIS C SCREENING  Completed    Pneumococcal Vaccine 50+  Completed    ZOSTER VACCINE  Completed                                                                                                                                                CMS Preventative Services Quick Reference  Risk Factors Identified During Encounter  Fall Risk-High or Moderate: Discussed Fall Prevention in the home    The above risks/problems have been discussed with the patient.  Pertinent information has been shared with the patient in the After Visit Summary.  An After Visit Summary and PPPS were made available to the patient.    Follow Up:   Next Medicare Wellness visit to be scheduled in 1 year.         Additional E&M  Note during same encounter follows:  Patient has additional, significant, and separately identifiable condition(s)/problem(s) that require work above and beyond the Medicare Wellness Visit     Chief Complaint  Medicare Wellness-subsequent    Subjective    HPI  Elisabet is also being seen today for additional medical problem/s.       The patient is a 69-year-old female who comes in for a follow-up visit.    She reports overall good health, with the exception of her right knee. She has been informed that she is not a suitable candidate for knee replacement due to her A1c levels being above 7. 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 This condition prevents her from fully extending her knee. She does not use a pillow for support while sleeping, instead opting to lie on her side. She has been receiving injections for approximately 4 years, followed by gel injections, but these have not significantly improved her condition. She finds relief from her symptoms through swimming.    Her blood sugar level was recorded as 82 this morning. She experiences diarrhea as a side effect of metformin. Increasing her WelChol dosage to 5 tablets resulted in vomiting, so she reduced it back to 4 tablets, which she tolerates well. She takes WelChol before bedtime. She has not undergone gallbladder removal and does not experience constipation.     She uses a recumbent bike for exercise but had to discontinue its use for about 3 weeks due to back pain.     ROS:  She reports no falls and states that her physical and mental health status remains unchanged from the previous year. She has not required overnight hospitalization. She does not have a living will and reports no memory or cognitive issues.   She reports no chest pain, heaviness, tightness, shortness of breath, fevers, fatigue, sweating, night sweats, rashes, nausea, vomiting, diarrhea, or blood in stools.     Medications:   She is currently on baby  "aspirin, glimepiride 4 mg (2 tablets in the morning), Basaglar 42 units, Januvia 100 mg, metformin (3 tablets daily), pioglitazone 30 mg, atorvastatin 40 mg, WelChol (4 tablets daily), valsartan 160 mg, bisoprolol 5 mg, amlodipine 10 mg, Lasix (as needed), potassium (with Lasix), and levothyroxine 75 mcg.    She had a kidney stone in the other kidney last year and has a checkup scheduled next week with Dr. Zheng. A KUB x-ray will be performed.    Review of Systems   Constitutional: Negative for fatigue and fever.   Respiratory: Negative for cough and shortness of breath.    Cardiovascular: Negative for chest pain and palpitations.   Skin: Negative for rash or itching            Objective   Vital Signs:  /74   Pulse 66   Resp 16   Ht 175.3 cm (69\")   Wt 112 kg (246 lb 12.8 oz)   SpO2 97%   BMI 36.45 kg/m²   Physical Exam  Vitals and nursing note reviewed.   Constitutional:       Appearance: Normal appearance.      Comments: Alert oriented pleasant white female no acute distress she uses a cane she is slow to ambulate.  Due to right knee pain   HENT:      Head: Normocephalic and atraumatic.      Nose: Nose normal.   Cardiovascular:      Rate and Rhythm: Normal rate and regular rhythm.   Pulmonary:      Effort: Pulmonary effort is normal.      Breath sounds: Normal breath sounds.   Abdominal:      Palpations: Abdomen is soft.      Tenderness: There is no abdominal tenderness.   Musculoskeletal:         General: Normal range of motion.      Cervical back: Normal range of motion and neck supple.      Right lower leg: No edema.      Left lower leg: No edema.   Skin:     General: Skin is warm and dry.   Neurological:      General: No focal deficit present.      Mental Status: She is alert.   Psychiatric:         Mood and Affect: Mood normal.         Behavior: Behavior normal.           Respiratory: Clear to auscultation, no wheezing, rales or rhonchi            Results  Labs   - A1c: 8.0   - Blood sugar: " 82   - CO2: 17   - Cholesterol: 141   - LDL: 74           Assessment and Plan    Diagnosis Plan   1. Encounter for subsequent annual wellness visit (AWV) in Medicare patient        2. Breast cancer screening by mammogram  Mammo Screening Digital Tomosynthesis Bilateral With CAD      3. At risk for osteoporosis in premenopausal patient  DEXA Bone Density Axial      4. Type 2 diabetes mellitus with hyperglycemia, with long-term current use of insulin  Microalbumin / Creatinine Urine Ratio - Urine, Clean Catch    Comprehensive Metabolic Panel    Hemoglobin A1c    Insulin Glargine (BASAGLAR KWIKPEN) 100 UNIT/ML injection pen    glimepiride (AMARYL) 4 MG tablet    colesevelam (Welchol) 625 MG tablet      5. Mixed hyperlipidemia  colesevelam (Welchol) 625 MG tablet      6. High risk medication use        7. Coronary artery disease involving coronary bypass graft of native heart without angina pectoris  atorvastatin (LIPITOR) 40 MG tablet    bisoprolol (ZEBeta) 5 MG tablet      8. Benign hypertension  valsartan (DIOVAN) 160 MG tablet      9. Primary hypertension  amLODIPine (NORVASC) 10 MG tablet      10. Hypothyroidism, unspecified type  levothyroxine (SYNTHROID, LEVOTHROID) 75 MCG tablet        Annual wellness completed as well as regular office visit    She knows she needs to work on cutting carbs and avoiding sweets exercise is limited given her knee situation but we will try to do swimming and low impact exercise with a recumbent bike or swimming.  And will take her WelChol with meals as directed and try to bump up to 5 tablets total daily dose--she knows to take multivitamins and Synthroid 4 hours away from her WelChol dose.    She tried 45 mg on Actos previously and had some swelling so we will stick with 30 mg at this point    She knows how to taper her Basaglar insulin properly and we reviewed the instructions.     1. Right knee pain.  - Persistent right knee pain and inability to fully extend the knee due to bone  spurs under the kneecap.  - Not a candidate for knee replacement surgery because A1c is not under 7.  - Tried gel injections for cartilage buildup without significant improvement.  - Swimming recommended as a low-impact exercise to help manage symptoms.    2. Diabetes Mellitus.  - A1c improved from 8.7 in 12/2024 to 8.0.  - Adjust Basaglar dosage based on fasting sugars: increase by 3 units if >150 and decrease by 3 units if <70 on Sundays and Wednesdays.  - Reduce carbohydrate intake and continue swimming for exercise.  - Refills for Basaglar insulin and glimepiride provided; A1c recheck in 3 months.    3. Hyperlipidemia.  - Cholesterol levels within normal range: total cholesterol 141, LDL 74.  - Currently on atorvastatin 40 mg and WelChol.  - Take WelChol with meals and separate from multivitamin by at least 4 hours.  - Refills for atorvastatin and WelChol provided.    4. Hypertension.  - Blood pressure well-controlled.  - Currently on valsartan 160 mg, bisoprolol 5 mg, and amlodipine 10 mg.  - Refills for these medications provided.    5. Hypothyroidism.  - TSH levels checked in 12/2024 and are stable.  - Currently on levothyroxine 75 mcg.  - Avoid taking Synthroid at the same time as WelChol or multivitamins.    6. Kidney stone.  - Had a kidney stone in the other kidney last year; checkup scheduled next week with Dr. Zheng.  - KUB x-ray will be performed.    7. Health Maintenance.  - Schedule DEXA scan and mammogram on the same day for convenience.  - Receive influenza vaccine in the fall and the new pneumonia vaccine.         Follow Up   Return in about 3 months (around 9/29/2025) for Recheck, Labs prior next visit.  Patient was given instructions and counseling regarding her condition or for health maintenance advice. Please see specific information pulled into the AVS if appropriate.  Patient or patient representative verbalized consent for the use of Ambient Listening during the visit with  Johny Brantley  MD for chart documentation. 7/3/2025  10:19 EDT

## 2025-07-05 DIAGNOSIS — Z79.4 TYPE 2 DIABETES MELLITUS WITH HYPERGLYCEMIA, WITH LONG-TERM CURRENT USE OF INSULIN: ICD-10-CM

## 2025-07-05 DIAGNOSIS — E11.65 TYPE 2 DIABETES MELLITUS WITH HYPERGLYCEMIA, WITH LONG-TERM CURRENT USE OF INSULIN: ICD-10-CM

## 2025-07-05 LAB
ALBUMIN/CREAT UR: 26 MG/G CREAT (ref 0–29)
CREAT UR-MCNC: 76.3 MG/DL
MICROALBUMIN UR-MCNC: 20.2 UG/ML

## 2025-07-07 RX ORDER — GLIMEPIRIDE 4 MG/1
4 TABLET ORAL 2 TIMES DAILY
Qty: 180 TABLET | Refills: 1 | OUTPATIENT
Start: 2025-07-07

## 2025-07-28 ENCOUNTER — TRANSCRIBE ORDERS (OUTPATIENT)
Dept: GENERAL RADIOLOGY | Facility: CLINIC | Age: 70
End: 2025-07-28
Payer: COMMERCIAL

## 2025-07-28 DIAGNOSIS — N20.0 KIDNEY STONE: Primary | ICD-10-CM

## 2025-08-24 DIAGNOSIS — E11.65 TYPE 2 DIABETES MELLITUS WITH HYPERGLYCEMIA, WITH LONG-TERM CURRENT USE OF INSULIN: ICD-10-CM

## 2025-08-24 DIAGNOSIS — Z79.4 TYPE 2 DIABETES MELLITUS WITH HYPERGLYCEMIA, WITH LONG-TERM CURRENT USE OF INSULIN: ICD-10-CM

## 2025-08-25 RX ORDER — PIOGLITAZONE 30 MG/1
30 TABLET ORAL DAILY
Qty: 90 TABLET | Refills: 0 | Status: SHIPPED | OUTPATIENT
Start: 2025-08-25